# Patient Record
Sex: MALE | Employment: FULL TIME | URBAN - METROPOLITAN AREA
[De-identification: names, ages, dates, MRNs, and addresses within clinical notes are randomized per-mention and may not be internally consistent; named-entity substitution may affect disease eponyms.]

---

## 2023-04-10 LAB
ALANINE AMINOTRANSFERASE (SGPT) (U/L) IN SER/PLAS: 40 U/L (ref 10–52)
ALBUMIN (G/DL) IN SER/PLAS: 4.3 G/DL (ref 3.4–5)
ALKALINE PHOSPHATASE (U/L) IN SER/PLAS: 77 U/L (ref 33–120)
ANION GAP IN SER/PLAS: 10 MMOL/L (ref 10–20)
APPEARANCE, URINE: CLEAR
ASPARTATE AMINOTRANSFERASE (SGOT) (U/L) IN SER/PLAS: 22 U/L (ref 9–39)
BASOPHILS (10*3/UL) IN BLOOD BY AUTOMATED COUNT: 0.02 X10E9/L (ref 0–0.1)
BASOPHILS/100 LEUKOCYTES IN BLOOD BY AUTOMATED COUNT: 0.4 % (ref 0–2)
BILIRUBIN TOTAL (MG/DL) IN SER/PLAS: 0.4 MG/DL (ref 0–1.2)
BILIRUBIN, URINE: NEGATIVE
BLOOD, URINE: NEGATIVE
CALCIUM (MG/DL) IN SER/PLAS: 9.1 MG/DL (ref 8.6–10.6)
CARBON DIOXIDE, TOTAL (MMOL/L) IN SER/PLAS: 28 MMOL/L (ref 21–32)
CHLORIDE (MMOL/L) IN SER/PLAS: 106 MMOL/L (ref 98–107)
CHOLESTEROL (MG/DL) IN SER/PLAS: 148 MG/DL (ref 0–199)
CHOLESTEROL IN HDL (MG/DL) IN SER/PLAS: 35.7 MG/DL
CHOLESTEROL/HDL RATIO: 4.1
COLOR, URINE: NORMAL
CREATININE (MG/DL) IN SER/PLAS: 0.82 MG/DL (ref 0.5–1.3)
EOSINOPHILS (10*3/UL) IN BLOOD BY AUTOMATED COUNT: 0.07 X10E9/L (ref 0–0.7)
EOSINOPHILS/100 LEUKOCYTES IN BLOOD BY AUTOMATED COUNT: 1.3 % (ref 0–6)
ERYTHROCYTE DISTRIBUTION WIDTH (RATIO) BY AUTOMATED COUNT: 12.9 % (ref 11.5–14.5)
ERYTHROCYTE MEAN CORPUSCULAR HEMOGLOBIN CONCENTRATION (G/DL) BY AUTOMATED: 32.2 G/DL (ref 32–36)
ERYTHROCYTE MEAN CORPUSCULAR VOLUME (FL) BY AUTOMATED COUNT: 95 FL (ref 80–100)
ERYTHROCYTES (10*6/UL) IN BLOOD BY AUTOMATED COUNT: 4.83 X10E12/L (ref 4.5–5.9)
ESTIMATED AVERAGE GLUCOSE FOR HBA1C: 120 MG/DL
GFR MALE: >90 ML/MIN/1.73M2
GLUCOSE (MG/DL) IN SER/PLAS: 98 MG/DL (ref 74–99)
GLUCOSE, URINE: NEGATIVE MG/DL
HEMATOCRIT (%) IN BLOOD BY AUTOMATED COUNT: 45.7 % (ref 41–52)
HEMOGLOBIN (G/DL) IN BLOOD: 14.7 G/DL (ref 13.5–17.5)
HEMOGLOBIN A1C/HEMOGLOBIN TOTAL IN BLOOD: 5.8 %
IMMATURE GRANULOCYTES/100 LEUKOCYTES IN BLOOD BY AUTOMATED COUNT: 0.2 % (ref 0–0.9)
KETONES, URINE: NEGATIVE MG/DL
LDL: 76 MG/DL (ref 0–99)
LEUKOCYTE ESTERASE, URINE: NEGATIVE
LEUKOCYTES (10*3/UL) IN BLOOD BY AUTOMATED COUNT: 5.3 X10E9/L (ref 4.4–11.3)
LYMPHOCYTES (10*3/UL) IN BLOOD BY AUTOMATED COUNT: 2.02 X10E9/L (ref 1.2–4.8)
LYMPHOCYTES/100 LEUKOCYTES IN BLOOD BY AUTOMATED COUNT: 37.8 % (ref 13–44)
MONOCYTES (10*3/UL) IN BLOOD BY AUTOMATED COUNT: 0.57 X10E9/L (ref 0.1–1)
MONOCYTES/100 LEUKOCYTES IN BLOOD BY AUTOMATED COUNT: 10.7 % (ref 2–10)
NEUTROPHILS (10*3/UL) IN BLOOD BY AUTOMATED COUNT: 2.65 X10E9/L (ref 1.2–7.7)
NEUTROPHILS/100 LEUKOCYTES IN BLOOD BY AUTOMATED COUNT: 49.6 % (ref 40–80)
NITRITE, URINE: NEGATIVE
NRBC (PER 100 WBCS) BY AUTOMATED COUNT: 0 /100 WBC (ref 0–0)
PH, URINE: 5 (ref 5–8)
PLATELETS (10*3/UL) IN BLOOD AUTOMATED COUNT: 179 X10E9/L (ref 150–450)
POTASSIUM (MMOL/L) IN SER/PLAS: 4.5 MMOL/L (ref 3.5–5.3)
PROSTATE SPECIFIC ANTIGEN,SCREEN: 0.45 NG/ML (ref 0–4)
PROTEIN TOTAL: 6.7 G/DL (ref 6.4–8.2)
PROTEIN, URINE: NEGATIVE MG/DL
SODIUM (MMOL/L) IN SER/PLAS: 139 MMOL/L (ref 136–145)
SPECIFIC GRAVITY, URINE: 1.01 (ref 1–1.03)
TRIGLYCERIDE (MG/DL) IN SER/PLAS: 181 MG/DL (ref 0–149)
UREA NITROGEN (MG/DL) IN SER/PLAS: 14 MG/DL (ref 6–23)
UROBILINOGEN, URINE: <2 MG/DL (ref 0–1.9)
VLDL: 36 MG/DL (ref 0–40)

## 2023-05-22 DIAGNOSIS — I10 HYPERTENSION, UNSPECIFIED TYPE: ICD-10-CM

## 2023-05-22 RX ORDER — LISINOPRIL 10 MG/1
10 TABLET ORAL DAILY
Qty: 90 TABLET | Refills: 2 | Status: SHIPPED | OUTPATIENT
Start: 2023-05-22 | End: 2023-05-22

## 2023-05-22 RX ORDER — LISINOPRIL 10 MG/1
1 TABLET ORAL DAILY
COMMUNITY
Start: 2019-03-15 | End: 2023-05-22 | Stop reason: SDUPTHER

## 2023-06-13 ENCOUNTER — PATIENT MESSAGE (OUTPATIENT)
Dept: PRIMARY CARE | Facility: CLINIC | Age: 48
End: 2023-06-13
Payer: COMMERCIAL

## 2023-06-13 DIAGNOSIS — U07.1 COVID-19: Primary | ICD-10-CM

## 2023-06-13 NOTE — PATIENT COMMUNICATION
Patient presenting today and is COVID positive with symptoms that started less than 5 days ago; this patient is experiencing with mild-mod symptoms, at home, not on oxygen. Given this patient's comorbidities and age this patient meets criteria for Paxlovid. This is dosed based on renal function.    I have e-scripted this in to the patient's preferred pharmacy. Patient is to isolate. All questions and concerns were addressed.

## 2023-08-07 ENCOUNTER — OFFICE VISIT (OUTPATIENT)
Dept: PRIMARY CARE | Facility: CLINIC | Age: 48
End: 2023-08-07
Payer: COMMERCIAL

## 2023-08-07 VITALS
TEMPERATURE: 98.7 F | HEIGHT: 68 IN | HEART RATE: 66 BPM | WEIGHT: 307 LBS | DIASTOLIC BLOOD PRESSURE: 87 MMHG | OXYGEN SATURATION: 97 % | SYSTOLIC BLOOD PRESSURE: 144 MMHG | BODY MASS INDEX: 46.53 KG/M2

## 2023-08-07 DIAGNOSIS — F32.A ANXIETY AND DEPRESSION: Primary | ICD-10-CM

## 2023-08-07 DIAGNOSIS — R73.03 PREDIABETES: ICD-10-CM

## 2023-08-07 DIAGNOSIS — I10 ESSENTIAL HYPERTENSION: ICD-10-CM

## 2023-08-07 DIAGNOSIS — F41.9 ANXIETY AND DEPRESSION: Primary | ICD-10-CM

## 2023-08-07 DIAGNOSIS — E66.01 CLASS 3 SEVERE OBESITY DUE TO EXCESS CALORIES WITH SERIOUS COMORBIDITY AND BODY MASS INDEX (BMI) OF 45.0 TO 49.9 IN ADULT (MULTI): ICD-10-CM

## 2023-08-07 DIAGNOSIS — Z12.11 ENCOUNTER FOR SCREENING FOR MALIGNANT NEOPLASM OF COLON: ICD-10-CM

## 2023-08-07 PROBLEM — J34.89 NASAL OBSTRUCTION: Status: ACTIVE | Noted: 2023-08-07

## 2023-08-07 PROBLEM — J06.9 URI (UPPER RESPIRATORY INFECTION): Status: ACTIVE | Noted: 2023-08-07

## 2023-08-07 PROBLEM — J34.2 DEVIATED NASAL SEPTUM: Status: ACTIVE | Noted: 2023-08-07

## 2023-08-07 PROBLEM — M25.531 BILATERAL WRIST PAIN: Status: ACTIVE | Noted: 2023-08-07

## 2023-08-07 PROBLEM — G56.21 CUBITAL TUNNEL SYNDROME, RIGHT: Status: ACTIVE | Noted: 2023-08-07

## 2023-08-07 PROBLEM — J30.9 ALLERGIC RHINITIS: Status: ACTIVE | Noted: 2023-08-07

## 2023-08-07 PROBLEM — M25.529 ELBOW PAIN: Status: ACTIVE | Noted: 2023-08-07

## 2023-08-07 PROBLEM — G56.22 ULNAR NEUROPATHY OF LEFT UPPER EXTREMITY: Status: ACTIVE | Noted: 2023-08-07

## 2023-08-07 PROBLEM — G47.33 OBSTRUCTIVE SLEEP APNEA: Status: ACTIVE | Noted: 2023-08-07

## 2023-08-07 PROBLEM — R09.81 NASAL CONGESTION: Status: ACTIVE | Noted: 2023-08-07

## 2023-08-07 PROBLEM — R03.0 ELEVATED BLOOD PRESSURE READING WITHOUT DIAGNOSIS OF HYPERTENSION: Status: ACTIVE | Noted: 2023-08-07

## 2023-08-07 PROBLEM — J34.3 HYPERTROPHY OF BOTH INFERIOR NASAL TURBINATES: Status: ACTIVE | Noted: 2023-08-07

## 2023-08-07 PROBLEM — R39.13 SPLIT URINARY STREAM: Status: ACTIVE | Noted: 2023-08-07

## 2023-08-07 PROBLEM — J31.0 CHRONIC RHINITIS: Status: ACTIVE | Noted: 2023-08-07

## 2023-08-07 PROBLEM — R33.9 URINARY RETENTION: Status: ACTIVE | Noted: 2023-08-07

## 2023-08-07 PROBLEM — N35.919 URETHRAL STRICTURE: Status: ACTIVE | Noted: 2023-08-07

## 2023-08-07 PROBLEM — N35.919 URETHRAL MEATAL STENOSIS: Status: ACTIVE | Noted: 2023-08-07

## 2023-08-07 PROBLEM — M95.0 NASAL VALVE COLLAPSE: Status: ACTIVE | Noted: 2023-08-07

## 2023-08-07 PROBLEM — M62.838 MUSCLE SPASM: Status: ACTIVE | Noted: 2023-08-07

## 2023-08-07 PROBLEM — J34.829 NASAL VALVE COLLAPSE: Status: ACTIVE | Noted: 2023-08-07

## 2023-08-07 PROBLEM — M25.532 BILATERAL WRIST PAIN: Status: ACTIVE | Noted: 2023-08-07

## 2023-08-07 PROBLEM — F51.04 CHRONIC INSOMNIA: Status: ACTIVE | Noted: 2023-08-07

## 2023-08-07 PROBLEM — M54.50 LOW BACK PAIN: Status: ACTIVE | Noted: 2023-08-07

## 2023-08-07 PROBLEM — G47.00 INSOMNIA: Status: ACTIVE | Noted: 2023-08-07

## 2023-08-07 PROBLEM — E66.813 CLASS 3 SEVERE OBESITY DUE TO EXCESS CALORIES WITH SERIOUS COMORBIDITY AND BODY MASS INDEX (BMI) OF 45.0 TO 49.9 IN ADULT: Status: ACTIVE | Noted: 2023-08-07

## 2023-08-07 PROCEDURE — 3077F SYST BP >= 140 MM HG: CPT | Performed by: STUDENT IN AN ORGANIZED HEALTH CARE EDUCATION/TRAINING PROGRAM

## 2023-08-07 PROCEDURE — 99214 OFFICE O/P EST MOD 30 MIN: CPT | Performed by: STUDENT IN AN ORGANIZED HEALTH CARE EDUCATION/TRAINING PROGRAM

## 2023-08-07 PROCEDURE — 1036F TOBACCO NON-USER: CPT | Performed by: STUDENT IN AN ORGANIZED HEALTH CARE EDUCATION/TRAINING PROGRAM

## 2023-08-07 PROCEDURE — 3079F DIAST BP 80-89 MM HG: CPT | Performed by: STUDENT IN AN ORGANIZED HEALTH CARE EDUCATION/TRAINING PROGRAM

## 2023-08-07 PROCEDURE — 3008F BODY MASS INDEX DOCD: CPT | Performed by: STUDENT IN AN ORGANIZED HEALTH CARE EDUCATION/TRAINING PROGRAM

## 2023-08-07 RX ORDER — LATANOPROST 50 UG/ML
1 SOLUTION/ DROPS OPHTHALMIC
COMMUNITY
Start: 2021-07-08 | End: 2024-02-12 | Stop reason: WASHOUT

## 2023-08-07 RX ORDER — ESCITALOPRAM OXALATE 20 MG/1
1 TABLET ORAL DAILY
COMMUNITY
Start: 2014-06-11 | End: 2024-02-12 | Stop reason: WASHOUT

## 2023-08-07 RX ORDER — AZELASTINE 1 MG/ML
2 SPRAY, METERED NASAL DAILY
COMMUNITY
Start: 2021-07-12 | End: 2024-02-12 | Stop reason: WASHOUT

## 2023-08-07 RX ORDER — NYSTATIN AND TRIAMCINOLONE ACETONIDE 100000; 1 [USP'U]/G; MG/G
CREAM TOPICAL 2 TIMES DAILY
COMMUNITY
Start: 2023-05-24 | End: 2024-02-12 | Stop reason: WASHOUT

## 2023-08-07 RX ORDER — BRIMONIDINE TARTRATE, TIMOLOL MALEATE 2; 5 MG/ML; MG/ML
SOLUTION/ DROPS OPHTHALMIC
COMMUNITY
Start: 2021-10-15 | End: 2024-02-21 | Stop reason: WASHOUT

## 2023-08-07 NOTE — PATIENT INSTRUCTIONS
Please stop at the lab ([address]) to complete your blood and/or urine work that I've ordered for you.    I will contact you with the results at my soonest convenience. I strongly urge you to use [employer] as this is the quickest and easiest way to access your results and recieve my correspondences.     Keep an eye on your blood pressure, I have a low threshold to increase your dosage of lisinopril.     I have ordered Cologuard to screen you for colon cancer. You will receive a kit at home.     I recommend that you lose weight via lifestyle modifications such as diet and exercise.    Follow up with me in 6 months.

## 2023-08-07 NOTE — PROGRESS NOTES
"Subjective   Patient ID: Jose Angel Winters is a 47 y.o. male who presents for Follow-up.    HPI   HTN, morbid obesity, anxiety/depression, prediabetes     Life/Social:  (Agatha) one son (Ramírez, 6).  at [employer]. Sedentary lifestyle. Dietary indiscretion. Nonsmoker. Social EtOH. No illicits.     Re:  - had surgery just last week for meatal stenosis. Urinating well. Healing nicely.      Re: HTN - at bit elevated today. Takes low dose ACEi. Reports no sx high or low from HTN; denies blurry vision, HA, dizziness LoC CP SoB Peacock and leg swelling. Pre-contemplative about changing the meds.      Re: anxiety - fair control on Lexapro 20mg. Wants to know if he can double up during \"stress crises\" which happen every few months and last a few days. I told him that this was OK temporarily, although may not get the desired results he's looking for. No HI/SI.     Re: obesity / PreDM - BMI > 40. A1c stable and A1c < 6. Not interested in starting medication. Contemplative about meaningful lifestyle changes.      Re: CLIFTON- on CPAP. Stable.      Re: HM - due for colon screen. due for PSA. Shots UTD.      PMHx, FHx, Social Hx, Surg Hx personally reviewed at this appointment. No pertinent findings and/or changes from prior (if applicable).     ROS: Denies wt gain/loss f/c HA LoC CP SOB NVDC. See HPI above, and scanned sheet (if applicable). All other systems are reviewed and are without complaint.        Review of Systems    Objective   /87   Pulse 66   Temp 37.1 °C (98.7 °F)   Ht 1.727 m (5' 8\")   Wt 139 kg (307 lb)   SpO2 97%   BMI 46.68 kg/m²     Physical Exam  Gen: morbidly obese, NAD. AAO x3.  HEENT: NC/AT. Anicteric sclera, symmetric pupils. MMM no thrush.  Neck: Soft, supple. No LAD. No goiter.  CV: RRR nl s1s2 no m/r/g  Pulm: CTAB no w/r/r, good air exchange  GI: obese, soft NTND BS+ no hsm  Ext: WWP no edema  Neuro: II-XII grossly intact, nonfocal systemic findings  MSK: 5/5 strength b/l UE and LE  Gait: " unremarkable     Lab Results   Component Value Date    WBC 5.3 04/10/2023    HGB 14.7 04/10/2023    HCT 45.7 04/10/2023     04/10/2023    CHOL 148 04/10/2023    TRIG 181 (H) 04/10/2023    HDL 35.7 (A) 04/10/2023    ALT 40 04/10/2023    AST 22 04/10/2023     04/10/2023    K 4.5 04/10/2023     04/10/2023    CREATININE 0.82 04/10/2023    BUN 14 04/10/2023    CO2 28 04/10/2023    TSH 1.29 08/04/2021    HGBA1C 5.8 (A) 04/10/2023     par    PULMONARY FUNCTION TESTING  Ordered by an unspecified provider.       Assessment/Plan   # Obesity  - counselled on wt loss via diet, exercise, and other lifestyle modifications     # Depression and/or Anxiety  - continue SSRI      # CLIFTON on CPAP  - continue using CPAP      # PreDM  - A1C q6 mos  - wt loss with diet and exercise; declines GLP1/GIB  - continue ACEi     # meatal stenosis: improved s/p surgery  - follow up urology PRN     # Health Maintenance  - routine blood work  - Colon Cancer Screening: cologuard ordered  - PSA: due, ordered today   - Immunizations: COVID UTD  - AAA screening: Not indicated      Problem List Items Addressed This Visit       Anxiety and depression - Primary    Essential hypertension    Class 3 severe obesity due to excess calories with serious comorbidity and body mass index (BMI) of 45.0 to 49.9 in adult (CMS/[facility])    Relevant Orders    Hemoglobin A1C    Prediabetes    Relevant Orders    Hemoglobin A1C     Other Visit Diagnoses       Encounter for screening for malignant neoplasm of colon        Relevant Orders    Cologuard® colon cancer screening

## 2023-09-30 ENCOUNTER — PHARMACY VISIT (OUTPATIENT)
Dept: PHARMACY | Facility: CLINIC | Age: 48
End: 2023-09-30
Payer: COMMERCIAL

## 2023-09-30 PROCEDURE — RXMED WILLOW AMBULATORY MEDICATION CHARGE

## 2023-10-01 ENCOUNTER — PHARMACY VISIT (OUTPATIENT)
Dept: PHARMACY | Facility: CLINIC | Age: 48
End: 2023-10-01
Payer: COMMERCIAL

## 2023-10-01 PROCEDURE — RXMED WILLOW AMBULATORY MEDICATION CHARGE

## 2023-10-16 ENCOUNTER — PHARMACY VISIT (OUTPATIENT)
Dept: PHARMACY | Facility: CLINIC | Age: 48
End: 2023-10-16
Payer: COMMERCIAL

## 2023-10-16 PROCEDURE — RXMED WILLOW AMBULATORY MEDICATION CHARGE

## 2023-10-24 ENCOUNTER — PHARMACY VISIT (OUTPATIENT)
Dept: PHARMACY | Facility: CLINIC | Age: 48
End: 2023-10-24
Payer: COMMERCIAL

## 2023-10-24 PROCEDURE — RXMED WILLOW AMBULATORY MEDICATION CHARGE

## 2023-10-27 ENCOUNTER — PHARMACY VISIT (OUTPATIENT)
Dept: PHARMACY | Facility: CLINIC | Age: 48
End: 2023-10-27
Payer: COMMERCIAL

## 2023-10-27 PROCEDURE — RXMED WILLOW AMBULATORY MEDICATION CHARGE

## 2023-11-24 ENCOUNTER — PHARMACY VISIT (OUTPATIENT)
Dept: PHARMACY | Facility: CLINIC | Age: 48
End: 2023-11-24
Payer: COMMERCIAL

## 2023-11-24 PROCEDURE — RXMED WILLOW AMBULATORY MEDICATION CHARGE

## 2023-11-27 ENCOUNTER — OFFICE VISIT (OUTPATIENT)
Dept: UROLOGY | Facility: CLINIC | Age: 48
End: 2023-11-27
Payer: COMMERCIAL

## 2023-11-27 VITALS — WEIGHT: 310.2 LBS | HEIGHT: 68 IN | TEMPERATURE: 96 F | BODY MASS INDEX: 47.01 KG/M2

## 2023-11-27 DIAGNOSIS — N35.811 OTHER STRICTURE OF URETHRAL MEATUS IN MALE: Primary | ICD-10-CM

## 2023-11-27 DIAGNOSIS — N32.0 STENOSIS (ACQUIRED) OF BLADDER NECK OR VESICOURETHRAL ORIFICE: ICD-10-CM

## 2023-11-27 LAB
POC APPEARANCE, URINE: CLEAR
POC BILIRUBIN, URINE: NEGATIVE
POC BLOOD, URINE: NEGATIVE
POC COLOR, URINE: YELLOW
POC GLUCOSE, URINE: NEGATIVE MG/DL
POC KETONES, URINE: NEGATIVE MG/DL
POC LEUKOCYTES, URINE: NEGATIVE
POC NITRITE,URINE: NEGATIVE
POC PH, URINE: 5.5 PH
POC PROTEIN, URINE: NEGATIVE MG/DL
POC SPECIFIC GRAVITY, URINE: 1.02
POC UROBILINOGEN, URINE: 0.2 EU/DL

## 2023-11-27 PROCEDURE — 99213 OFFICE O/P EST LOW 20 MIN: CPT | Performed by: STUDENT IN AN ORGANIZED HEALTH CARE EDUCATION/TRAINING PROGRAM

## 2023-11-27 PROCEDURE — 3008F BODY MASS INDEX DOCD: CPT | Performed by: STUDENT IN AN ORGANIZED HEALTH CARE EDUCATION/TRAINING PROGRAM

## 2023-11-27 PROCEDURE — 1036F TOBACCO NON-USER: CPT | Performed by: STUDENT IN AN ORGANIZED HEALTH CARE EDUCATION/TRAINING PROGRAM

## 2023-11-27 PROCEDURE — 81003 URINALYSIS AUTO W/O SCOPE: CPT | Performed by: STUDENT IN AN ORGANIZED HEALTH CARE EDUCATION/TRAINING PROGRAM

## 2023-11-27 RX ORDER — TALC
3 POWDER (GRAM) TOPICAL NIGHTLY
COMMUNITY

## 2023-11-27 RX ORDER — CHOLECALCIFEROL (VITAMIN D3) 50 MCG
50 TABLET ORAL DAILY
COMMUNITY

## 2023-11-27 NOTE — PROGRESS NOTES
Jose Angel presents for a follow up evaluation.  The patient’s EMR has been reviewed.  Lives in [place], [place]  Occupation:   Referred by Dr. Guzman for meatal stenosis.   s/p meatal urethroplasty with me. (08/04/23)  Cysto revealed no other strictures or abnormalities.  26 Fr opening created. He was left with no catheter.    SUBJECTIVE: HPI   TODAY (11/27/23)  Doing well overall.   No acute or worsening complaints today.  Stream has remained good, occasional splitting.   Remains satisfied with the results of the procedure.   Denies any worsening LUTS.  Actually since discontinuing coffee his LUTS have improved.   No recent UTI's, dysuria, gross hematuria, fevers, chills or rash.     TO REVIEW: Last evaluation (08/28/23)  Reports he is doing well overall, post-operatively.  Experienced mild discomfort initially post-op.  Well-controlled with OTC Tylenol PRN.  Reports his stream is now improved.  Continues to spray while voiding, but this is not bothersome.  Reports persistent sensation of incomplete emptying occasionally.  No recent UTIs, gross hematuria, fevers or chills.     Uroflow results:  Peak 23 mL/s, normal curve.   mL  PVR 71 mL.     TO REVIEW: Initial evaluation (06/30/23)  Hx of prostatitis, morbid obesity, HTN, depression, anxiety, CLIFTON (on CPAP).  Reports developing severe anxiety towards medical procedures.   Has required sedation in the past.      C/O gradually worsening frequency, urgency, and straining to void.  Occasional sensation of incomplete bladder emptying.   Ongoing for the last couple years.   however became bothersome about 10 months ago.  Previously evaluated by Dr. Guzman whom prescribed a topical steroid   He denies any significant improvement with this, thus opted to discontinue.   Last used about 1.5 weeks ago.   Denies any recent UTIs, gross hematuria, flank pain, fevers or chills.     Prior Uroflow results: (05/24/23)  Qmax: 17 mL/s, curve  VV: 380 mL  PVR: 71 mL      PMHx of prostatitis remotely, 2-3x, presumed 2/2 UTI.   Successfully treated with ciprofloxacin.    PSHx of inguinal hernia repair, sebaceous cyst removal of the cheek (oral).  Denies FHx of  malignancy.  Non-smoker.    Past medical, surgical, family and social history in the chart was reviewed and is accurate including any additions to what is in this HPI.    Review of Systems   Constitutional: denies any unintentional weight loss or change in strength.  Integumentary: denies any rashes or pruritus.  Eyes: denies any double vision or eye pain.  Ear/Nose/Mouth/Throat: denies any nosebleeds or gum bleeds.  Cardiovascular: denies any chest pain or syncope.  Respiratory: denies hemoptysis.  Gastrointestinal: denies nausea or vomiting.  Musculoskeletal: denies muscle cramping or weakness.  Neurologic: denies convulsions or seizures.  Hematologic/Lymphatic: denies bleeding tendencies.  Endocrine: denies heat/cold intolerance.  All other systems have been reviewed and are negative unless otherwise noted in the HPI.    OBJECTIVE:  There were no vitals taken for this visit.  Physical Exam   Constitutional: No obvious distress.  Eyes: Non-injected conjunctiva, sclera clear, EOMI.  Ears/Nose/Mouth/Throat: No obvious drainage per ears or nose.  Cardiovascular: Extremities are warm and well perfused. No edema, cyanosis or pallor.  Respiratory: No audible wheezing/stridor; respirations do not appear labored.  Gastrointestinal: Abdomen soft, not distended.  Musculoskeletal: Normal ROM of extremities.  Skin: No obvious rashes or open sores.  Neurologic: Alert and oriented, CN 2-12 grossly intact.  Psychiatric: Answers questions appropriately with normal affect.  Hematologic/Lymphatic/Immunologic: No obvious bruises or sites of spontaneous bleeding.  Genitourinary: No CVA tenderness, bladder not palpable.     Labs and imaging:  Lab Results   Component Value Date    WBC 6.0 07/20/2023    HGB 14.5 07/20/2023    HCT 45.3  07/20/2023     07/20/2023    CHOL 148 04/10/2023    TRIG 181 (H) 04/10/2023    HDL 35.7 (A) 04/10/2023    ALT 74 (H) 07/20/2023    AST 42 (H) 07/20/2023     07/20/2023    K 4.3 07/20/2023     07/20/2023    CREATININE 0.9 07/20/2023    BUN 12 07/20/2023    CO2 26 07/20/2023    TSH 1.29 08/04/2021    INR 0.9 07/20/2023    HGBA1C 5.8 (A) 04/10/2023     ASSESSMENT:  Problem List Items Addressed This Visit    None    1. Meatal stenosis  2. Irritable voiding symptoms  3. Anxiety/depression - severe anxiety towards undergoing medical procedures.  4. CLIFTON - on CPAP  5. Morbid obesity  6. HTN    PLAN:  s/p meatal urethroplasty with me. (08/04/23)  Remains satisfied with the results of the procedure.   Continues to progress well post-operatively.  RTC in about 9 months for reassessment and flow/pvr.   Encouraged to call if he develops any acute or worsening symptoms.    All questions were answered to the patient’s satisfaction.  Patient agrees with the plan and wishes to proceed.    Scribed for Dr. Jesus Carter by Emil Beckman.  I, Dr. Jesus Carter have personally reviewed and agreed with the information entered by the Virtual Scribe. 11/27/23.

## 2024-01-03 DIAGNOSIS — Z00.00 HEALTHCARE MAINTENANCE: ICD-10-CM

## 2024-01-03 PROCEDURE — RXMED WILLOW AMBULATORY MEDICATION CHARGE

## 2024-01-03 RX ORDER — ESCITALOPRAM OXALATE 20 MG/1
20 TABLET ORAL DAILY
Qty: 90 TABLET | Refills: 3 | Status: SHIPPED | OUTPATIENT
Start: 2024-01-03 | End: 2025-01-01

## 2024-01-04 ENCOUNTER — PHARMACY VISIT (OUTPATIENT)
Dept: PHARMACY | Facility: CLINIC | Age: 49
End: 2024-01-04
Payer: COMMERCIAL

## 2024-02-11 PROCEDURE — RXMED WILLOW AMBULATORY MEDICATION CHARGE

## 2024-02-11 RX ORDER — BRIMONIDINE TARTRATE AND TIMOLOL MALEATE 2; 5 MG/ML; MG/ML
SOLUTION OPHTHALMIC
Qty: 5 ML | Refills: 4 | Status: CANCELLED | OUTPATIENT
Start: 2024-02-11 | End: 2025-02-10

## 2024-02-11 ASSESSMENT — ENCOUNTER SYMPTOMS: HYPERTENSION: 1

## 2024-02-12 ENCOUNTER — PHARMACY VISIT (OUTPATIENT)
Dept: PHARMACY | Facility: CLINIC | Age: 49
End: 2024-02-12
Payer: COMMERCIAL

## 2024-02-12 ENCOUNTER — OFFICE VISIT (OUTPATIENT)
Dept: PRIMARY CARE | Facility: CLINIC | Age: 49
End: 2024-02-12
Payer: COMMERCIAL

## 2024-02-12 VITALS
HEIGHT: 67 IN | OXYGEN SATURATION: 96 % | TEMPERATURE: 96.9 F | DIASTOLIC BLOOD PRESSURE: 81 MMHG | SYSTOLIC BLOOD PRESSURE: 151 MMHG | BODY MASS INDEX: 49.28 KG/M2 | HEART RATE: 84 BPM | WEIGHT: 314 LBS

## 2024-02-12 DIAGNOSIS — F41.9 ANXIETY AND DEPRESSION: ICD-10-CM

## 2024-02-12 DIAGNOSIS — F32.A ANXIETY AND DEPRESSION: ICD-10-CM

## 2024-02-12 DIAGNOSIS — E78.5 HYPERLIPIDEMIA, UNSPECIFIED HYPERLIPIDEMIA TYPE: ICD-10-CM

## 2024-02-12 DIAGNOSIS — I10 ESSENTIAL HYPERTENSION: ICD-10-CM

## 2024-02-12 DIAGNOSIS — E66.01 CLASS 3 SEVERE OBESITY DUE TO EXCESS CALORIES WITH SERIOUS COMORBIDITY AND BODY MASS INDEX (BMI) OF 45.0 TO 49.9 IN ADULT (MULTI): ICD-10-CM

## 2024-02-12 DIAGNOSIS — Z00.00 HEALTHCARE MAINTENANCE: Primary | ICD-10-CM

## 2024-02-12 DIAGNOSIS — R73.03 PREDIABETES: ICD-10-CM

## 2024-02-12 DIAGNOSIS — I10 HYPERTENSION, UNSPECIFIED TYPE: ICD-10-CM

## 2024-02-12 DIAGNOSIS — Z12.5 ENCOUNTER FOR SCREENING FOR MALIGNANT NEOPLASM OF PROSTATE: ICD-10-CM

## 2024-02-12 PROBLEM — J06.9 URI (UPPER RESPIRATORY INFECTION): Status: RESOLVED | Noted: 2023-08-07 | Resolved: 2024-02-12

## 2024-02-12 PROBLEM — M25.529 ELBOW PAIN: Status: RESOLVED | Noted: 2023-08-07 | Resolved: 2024-02-12

## 2024-02-12 PROBLEM — G56.22 ULNAR NEUROPATHY OF LEFT UPPER EXTREMITY: Status: RESOLVED | Noted: 2023-08-07 | Resolved: 2024-02-12

## 2024-02-12 PROBLEM — M25.532 BILATERAL WRIST PAIN: Status: RESOLVED | Noted: 2023-08-07 | Resolved: 2024-02-12

## 2024-02-12 PROBLEM — M62.838 MUSCLE SPASM: Status: RESOLVED | Noted: 2023-08-07 | Resolved: 2024-02-12

## 2024-02-12 PROBLEM — F51.04 CHRONIC INSOMNIA: Status: RESOLVED | Noted: 2023-08-07 | Resolved: 2024-02-12

## 2024-02-12 PROBLEM — M25.531 BILATERAL WRIST PAIN: Status: RESOLVED | Noted: 2023-08-07 | Resolved: 2024-02-12

## 2024-02-12 PROBLEM — J34.3 HYPERTROPHY OF BOTH INFERIOR NASAL TURBINATES: Status: RESOLVED | Noted: 2023-08-07 | Resolved: 2024-02-12

## 2024-02-12 PROBLEM — M95.0 NASAL VALVE COLLAPSE: Status: RESOLVED | Noted: 2023-08-07 | Resolved: 2024-02-12

## 2024-02-12 PROBLEM — R09.81 NASAL CONGESTION: Status: RESOLVED | Noted: 2023-08-07 | Resolved: 2024-02-12

## 2024-02-12 PROBLEM — N35.919 URETHRAL MEATAL STENOSIS: Status: RESOLVED | Noted: 2023-08-07 | Resolved: 2024-02-12

## 2024-02-12 PROBLEM — G56.21 CUBITAL TUNNEL SYNDROME, RIGHT: Status: RESOLVED | Noted: 2023-08-07 | Resolved: 2024-02-12

## 2024-02-12 PROBLEM — N35.919 URETHRAL STRICTURE: Status: RESOLVED | Noted: 2023-08-07 | Resolved: 2024-02-12

## 2024-02-12 PROBLEM — J34.89 NASAL OBSTRUCTION: Status: RESOLVED | Noted: 2023-08-07 | Resolved: 2024-02-12

## 2024-02-12 PROBLEM — H40.1131 PRIMARY OPEN ANGLE GLAUCOMA (POAG) OF BOTH EYES, MILD STAGE: Status: RESOLVED | Noted: 2023-11-17 | Resolved: 2024-02-12

## 2024-02-12 PROBLEM — R33.9 URINARY RETENTION: Status: RESOLVED | Noted: 2023-08-07 | Resolved: 2024-02-12

## 2024-02-12 PROBLEM — J34.829 NASAL VALVE COLLAPSE: Status: RESOLVED | Noted: 2023-08-07 | Resolved: 2024-02-12

## 2024-02-12 PROBLEM — J34.2 DEVIATED NASAL SEPTUM: Status: RESOLVED | Noted: 2023-08-07 | Resolved: 2024-02-12

## 2024-02-12 PROBLEM — R39.13 SPLIT URINARY STREAM: Status: RESOLVED | Noted: 2023-08-07 | Resolved: 2024-02-12

## 2024-02-12 PROBLEM — J30.9 ALLERGIC RHINITIS: Status: RESOLVED | Noted: 2023-08-07 | Resolved: 2024-02-12

## 2024-02-12 PROBLEM — R03.0 ELEVATED BLOOD PRESSURE READING WITHOUT DIAGNOSIS OF HYPERTENSION: Status: RESOLVED | Noted: 2023-08-07 | Resolved: 2024-02-12

## 2024-02-12 PROCEDURE — 99213 OFFICE O/P EST LOW 20 MIN: CPT | Performed by: STUDENT IN AN ORGANIZED HEALTH CARE EDUCATION/TRAINING PROGRAM

## 2024-02-12 PROCEDURE — 1036F TOBACCO NON-USER: CPT | Performed by: STUDENT IN AN ORGANIZED HEALTH CARE EDUCATION/TRAINING PROGRAM

## 2024-02-12 PROCEDURE — 99396 PREV VISIT EST AGE 40-64: CPT | Performed by: STUDENT IN AN ORGANIZED HEALTH CARE EDUCATION/TRAINING PROGRAM

## 2024-02-12 PROCEDURE — 3079F DIAST BP 80-89 MM HG: CPT | Performed by: STUDENT IN AN ORGANIZED HEALTH CARE EDUCATION/TRAINING PROGRAM

## 2024-02-12 PROCEDURE — 3077F SYST BP >= 140 MM HG: CPT | Performed by: STUDENT IN AN ORGANIZED HEALTH CARE EDUCATION/TRAINING PROGRAM

## 2024-02-12 PROCEDURE — RXMED WILLOW AMBULATORY MEDICATION CHARGE

## 2024-02-12 PROCEDURE — 3008F BODY MASS INDEX DOCD: CPT | Performed by: STUDENT IN AN ORGANIZED HEALTH CARE EDUCATION/TRAINING PROGRAM

## 2024-02-12 RX ORDER — BRIMONIDINE TARTRATE AND TIMOLOL MALEATE 2; 5 MG/ML; MG/ML
SOLUTION OPHTHALMIC
Qty: 5 ML | Refills: 3 | OUTPATIENT
Start: 2024-02-12

## 2024-02-12 RX ORDER — LISINOPRIL 20 MG/1
20 TABLET ORAL DAILY
Qty: 90 TABLET | Refills: 3 | Status: SHIPPED | OUTPATIENT
Start: 2024-02-12 | End: 2025-02-11

## 2024-02-12 ASSESSMENT — ENCOUNTER SYMPTOMS: HYPERTENSION: 1

## 2024-02-12 NOTE — PATIENT INSTRUCTIONS
Please stop at the lab ([address]) to complete your blood and/or urine work that I've ordered for you.    I will contact you with the results at my soonest convenience. I strongly urge you to use [employer] as this is the quickest and easiest way to access your results and receive my correspondences.    I have added or changed your medications today. See the medication section of this packet for full details.      I recommend that you lose weight via lifestyle modifications such as diet and exercise.     I am referring you to our Pharmacist to help control your blood pressure.     Your cologuard order remains active, you need to turn in the kit.

## 2024-02-12 NOTE — PROGRESS NOTES
"Subjective   Patient ID: Jose Angel Winters is a 48 y.o. male who presents for No chief complaint on file..    Hypertension  This is a chronic problem. The current episode started more than 1 year ago. The problem has been waxing and waning since onset. The problem is controlled. Associated symptoms include anxiety. Agents associated with hypertension include NSAIDs. Risk factors for coronary artery disease include obesity and stress. Compliance problems include diet, exercise and psychosocial issues.       Re: HTN - at bit elevated today. Takes low dose ACEi. Reports no sx high or low from HTN; denies blurry vision, HA, dizziness LoC CP SoB Peacock and leg swelling. Pre-contemplative about changing the meds.     Re:  - Doing great since meatal stenosis surgery.      Re: anxiety - fair control on Lexapro 20mg. Wants to know if he can double up during \"stress crises\" which happen every few months and last a few days. I told him that this was OK temporarily, although may not get the desired results he's looking for. No HI/SI.     Re: obesity / PreDM - BMI > 40. A1c stable and A1c < 6 due for repeat A1c. Not interested in starting medication. Contemplative about meaningful lifestyle changes.      Re: CLIFTON - on CPAP. Stable.      Re: HM - due for colon screen; cologuard box is at his house he just needs to turn it in. Due for PSA. Shots UTD.      PMHx, FHx, Social Hx, Surg Hx personally reviewed at this appointment. No pertinent findings and/or changes from prior (if applicable).     ROS: Denies wt gain/loss f/c HA LoC CP SOB NVDC. See HPI above, and scanned sheet (if applicable). All other systems are reviewed and are without complaint.            Review of Systems    Objective   /81   Pulse 84   Temp 36.1 °C (96.9 °F)   Ht 1.702 m (5' 7\")   Wt 142 kg (314 lb)   SpO2 96%   BMI 49.18 kg/m²     Physical Exam  Gen: morbidly obese, NAD. AAO x3.  HEENT: NC/AT. Anicteric sclera, symmetric pupils. MMM no thrush.  Neck: " Soft, supple. No LAD. No goiter.  CV: RRR nl s1s2 no m/r/g  Pulm: CTAB no w/r/r, good air exchange  GI: obese, soft NTND BS+ no hsm  Ext: WWP no edema  Neuro: II-XII grossly intact, nonfocal systemic findings  MSK: 5/5 strength b/l UE and LE  Gait: unremarkable     Lab Results   Component Value Date    WBC 6.0 07/20/2023    HGB 14.5 07/20/2023    HCT 45.3 07/20/2023     07/20/2023    CHOL 148 04/10/2023    TRIG 181 (H) 04/10/2023    HDL 35.7 (A) 04/10/2023    ALT 74 (H) 07/20/2023    AST 42 (H) 07/20/2023     07/20/2023    K 4.3 07/20/2023     07/20/2023    CREATININE 0.9 07/20/2023    BUN 12 07/20/2023    CO2 26 07/20/2023    TSH 1.29 08/04/2021    INR 0.9 07/20/2023    HGBA1C 5.8 (A) 04/10/2023     par    PULMONARY FUNCTION TESTING  Ordered by an unspecified provider.     Assessment/Plan   # HTN: not at goal - separate from remainder of preventative visit   - ambulatory pressures, RTC 4-8 weeks with BP log  - routine blood/urine work, if not recent  - lifestyle modifications discussed at length   - increase Lisinopril  - APC referral    ------  # Obesity  - counselled on wt loss via diet, exercise, and other lifestyle modifications     # Depression and/or Anxiety  - continue SSRI      # CLIFTON on CPAP  - continue using CPAP      # PreDM  - A1C q6 mos  - wt loss with diet and exercise; declines GLP1/GIB  - continue ACEi, increased dose     # meatal stenosis: improved s/p surgery  - follow up urology PRN     # Health Maintenance  - routine blood work  - Colon Cancer Screening: cologuard order is active  - PSA: due, ordered today   - Immunizations: UTD  - AAA screening: Not indicated     Problem List Items Addressed This Visit             ICD-10-CM    Essential hypertension I10    Relevant Orders    Follow Up In [facility] - Pharmacy    Class 3 severe obesity due to excess calories with serious comorbidity and body mass index (BMI) of 45.0 to 49.9 in adult (CMS/HCC) E66.01, Z68.42     Prediabetes R73.03    Relevant Orders    Hemoglobin A1C     Other Visit Diagnoses         Codes    Healthcare maintenance    -  Primary Z00.00    Hypertension, unspecified type     I10    Relevant Medications    lisinopril 20 mg tablet    Hyperlipidemia, unspecified hyperlipidemia type     E78.5    Relevant Orders    CBC and Auto Differential    Comprehensive Metabolic Panel    Lipid Panel    Encounter for screening for malignant neoplasm of prostate     Z12.5    Relevant Orders    PSA

## 2024-02-21 ENCOUNTER — TELEMEDICINE (OUTPATIENT)
Dept: PHARMACY | Facility: HOSPITAL | Age: 49
End: 2024-02-21
Payer: COMMERCIAL

## 2024-02-21 DIAGNOSIS — I10 ESSENTIAL HYPERTENSION: ICD-10-CM

## 2024-02-21 RX ORDER — CETIRIZINE HYDROCHLORIDE 10 MG/1
10 TABLET ORAL DAILY
COMMUNITY

## 2024-02-21 NOTE — PROGRESS NOTES
"Hypertension Clinic   Jose Angel Winters was referred to the Clinical Pharmacy Team for his blood pressure management.    Referring Provider: Srinath Petty MD     HYPERTENSION ASSESSMENT    - BP in office = 151/81; lisinopril increased from 10 mg to 20 mg    CURRENT PHARMACOTHERAPY  - Lisinopril 20 mg daily    HISTORICAL PHARMACOTHERAPY  - None    Blood Pressure Monitor Validated: No  - Has a large arm cuff    SMBP MEASUREMENTS  Date Systolic Diastolic Heart Rate   -      -        Has the patient experienced any low blood pressures since last contact? No  - denies any  symptoms of low blood pressure: lightheadedness, dizziness, falls, blurry vision, clammy/pale skin   Has the patient experienced any high blood pressures since last contact? No  - denies any  symptoms of high blood pressure: headache, chest pain, vision problems    RELEVANT LAB RESULTS  Lab Results   Component Value Date    CREATININE 0.9 07/20/2023    BUN 12 07/20/2023     07/20/2023    K 4.3 07/20/2023     07/20/2023    CO2 26 07/20/2023     Lab Results   Component Value Date    CHOL 148 04/10/2023    CHOL 131 07/25/2022    CHOL 145 08/04/2021     Lab Results   Component Value Date    HDL 35.7 (A) 04/10/2023    HDL 33.0 (A) 07/25/2022    HDL 35.1 (A) 08/04/2021     No results found for: \"LDLCALC\"  Lab Results   Component Value Date    TRIG 181 (H) 04/10/2023    TRIG 101 07/25/2022    TRIG 150 (H) 08/04/2021     No components found for: \"CHOLHDL\"  The ASCVD Risk score (Jeison LUNA, et al., 2019) failed to calculate for the following reasons:    Unable to determine if patient is Non-      PATIENT EDUCATION/GOALS  Blood pressure goal is less than 140/90 mmHg    Eat right: Your diet should be rich in fruits, vegetables, potassium, and low-fat dairy products. You should also reduce your intake of sodium and fats, particularly saturated fats.  Maintain a healthy weight: Try to achieve and maintain a healthy weight. If " you are unsure what this means for you, please contact our clinic.  Exercise: Try to get at least 30 minutes of aerobic exercise every day.  Moderate your alcohol consumption: Limit your alcohol intake to one drink per day.  Monitor your blood pressure: You should check your blood pressure regularly. Notify our clinic if your blood pressure readings are consistently higher than recommended.    PROVIDER IMPRESSIONS/PLAN:    Assessment/Plan   Problem List Items Addressed This Visit             ICD-10-CM    Essential hypertension I10     1. Blood Pressure: borderline controlled.   - Blood Pressure monitor is not validated for at home use    2 Pharmacologic changes:   CONTINUE: Lisinopril 20 mg daily  Continue all meds under the continuation of care with the referring provider and clinical pharmacy team.    3. Counseling Points:  - I have counseled this patient on appropriate blood pressure monitor techniques, provided a blood pressure monitor log, and have advised the patient to contact me with questions regarding their blood pressure.    Pharmacist Follow Up: 3/4 9am in person BP cuff validation         Relevant Orders    Follow Up In [facility] - Pharmacy     Thank you,   Marissa Patrick, PharmD, [facility]    Continue all meds under the continuation of care with the referring provider and clinical pharmacy team.

## 2024-02-22 NOTE — ASSESSMENT & PLAN NOTE
1. Blood Pressure: borderline controlled.   - Blood Pressure monitor is not validated for at home use    2 Pharmacologic changes:   CONTINUE: Lisinopril 20 mg daily  Continue all meds under the continuation of care with the referring provider and clinical pharmacy team.    3. Counseling Points:  - I have counseled this patient on appropriate blood pressure monitor techniques, provided a blood pressure monitor log, and have advised the patient to contact me with questions regarding their blood pressure.    Pharmacist Follow Up: 3/4 9am in person BP cuff validation

## 2024-02-23 LAB — NONINV COLON CA DNA+OCC BLD SCRN STL QL: NEGATIVE

## 2024-03-04 ENCOUNTER — CLINICAL SUPPORT (OUTPATIENT)
Dept: PRIMARY CARE | Facility: CLINIC | Age: 49
End: 2024-03-04
Payer: COMMERCIAL

## 2024-03-04 VITALS — DIASTOLIC BLOOD PRESSURE: 72 MMHG | HEART RATE: 71 BPM | SYSTOLIC BLOOD PRESSURE: 110 MMHG

## 2024-03-04 DIAGNOSIS — I10 ESSENTIAL HYPERTENSION: ICD-10-CM

## 2024-03-04 NOTE — ASSESSMENT & PLAN NOTE
1. Blood Pressure: well controlled.   - Blood Pressure monitor is validated for at home use     2 Pharmacologic changes:   CONTINUE: Lisinopril 20 mg daily  Continue all meds under the continuation of care with the referring provider and clinical pharmacy team.     3. Counseling Points:  - I have counseled this patient on appropriate blood pressure monitor techniques, provided a blood pressure monitor log, and have advised the patient to contact me with questions regarding their blood pressure.     Pharmacist Follow Up: 3/18 9:30 AM for lab & BP follow up

## 2024-03-11 DIAGNOSIS — M25.579 CHRONIC ANKLE PAIN, UNSPECIFIED LATERALITY: Primary | ICD-10-CM

## 2024-03-11 DIAGNOSIS — G89.29 CHRONIC ANKLE PAIN, UNSPECIFIED LATERALITY: Primary | ICD-10-CM

## 2024-03-13 ENCOUNTER — PHARMACY VISIT (OUTPATIENT)
Dept: PHARMACY | Facility: CLINIC | Age: 49
End: 2024-03-13
Payer: COMMERCIAL

## 2024-03-13 PROCEDURE — RXMED WILLOW AMBULATORY MEDICATION CHARGE

## 2024-03-15 ENCOUNTER — LAB (OUTPATIENT)
Dept: LAB | Facility: LAB | Age: 49
End: 2024-03-15
Payer: COMMERCIAL

## 2024-03-15 DIAGNOSIS — R73.03 PREDIABETES: ICD-10-CM

## 2024-03-15 DIAGNOSIS — E78.5 HYPERLIPIDEMIA, UNSPECIFIED HYPERLIPIDEMIA TYPE: ICD-10-CM

## 2024-03-15 DIAGNOSIS — Z12.5 ENCOUNTER FOR SCREENING FOR MALIGNANT NEOPLASM OF PROSTATE: ICD-10-CM

## 2024-03-15 DIAGNOSIS — E66.01 CLASS 3 SEVERE OBESITY DUE TO EXCESS CALORIES WITH SERIOUS COMORBIDITY AND BODY MASS INDEX (BMI) OF 45.0 TO 49.9 IN ADULT (MULTI): ICD-10-CM

## 2024-03-15 LAB
ALBUMIN SERPL BCP-MCNC: 4.2 G/DL (ref 3.4–5)
ALP SERPL-CCNC: 87 U/L (ref 33–120)
ALT SERPL W P-5'-P-CCNC: 59 U/L (ref 10–52)
ANION GAP SERPL CALC-SCNC: 12 MMOL/L (ref 10–20)
AST SERPL W P-5'-P-CCNC: 31 U/L (ref 9–39)
BASOPHILS # BLD AUTO: 0.01 X10*3/UL (ref 0–0.1)
BASOPHILS NFR BLD AUTO: 0.2 %
BILIRUB SERPL-MCNC: 0.4 MG/DL (ref 0–1.2)
BUN SERPL-MCNC: 10 MG/DL (ref 6–23)
CALCIUM SERPL-MCNC: 8.6 MG/DL (ref 8.6–10.6)
CHLORIDE SERPL-SCNC: 104 MMOL/L (ref 98–107)
CHOLEST SERPL-MCNC: 137 MG/DL (ref 0–199)
CHOLESTEROL/HDL RATIO: 4.1
CO2 SERPL-SCNC: 28 MMOL/L (ref 21–32)
CREAT SERPL-MCNC: 0.76 MG/DL (ref 0.5–1.3)
EGFRCR SERPLBLD CKD-EPI 2021: >90 ML/MIN/1.73M*2
EOSINOPHIL # BLD AUTO: 0.07 X10*3/UL (ref 0–0.7)
EOSINOPHIL NFR BLD AUTO: 1.3 %
ERYTHROCYTE [DISTWIDTH] IN BLOOD BY AUTOMATED COUNT: 12.9 % (ref 11.5–14.5)
EST. AVERAGE GLUCOSE BLD GHB EST-MCNC: 123 MG/DL
GLUCOSE SERPL-MCNC: 100 MG/DL (ref 74–99)
HBA1C MFR BLD: 5.9 %
HCT VFR BLD AUTO: 42.3 % (ref 41–52)
HDLC SERPL-MCNC: 33.5 MG/DL
HGB BLD-MCNC: 14 G/DL (ref 13.5–17.5)
IMM GRANULOCYTES # BLD AUTO: 0.01 X10*3/UL (ref 0–0.7)
IMM GRANULOCYTES NFR BLD AUTO: 0.2 % (ref 0–0.9)
LDLC SERPL CALC-MCNC: 79 MG/DL
LYMPHOCYTES # BLD AUTO: 1.68 X10*3/UL (ref 1.2–4.8)
LYMPHOCYTES NFR BLD AUTO: 30.6 %
MCH RBC QN AUTO: 30.3 PG (ref 26–34)
MCHC RBC AUTO-ENTMCNC: 33.1 G/DL (ref 32–36)
MCV RBC AUTO: 92 FL (ref 80–100)
MONOCYTES # BLD AUTO: 0.57 X10*3/UL (ref 0.1–1)
MONOCYTES NFR BLD AUTO: 10.4 %
NEUTROPHILS # BLD AUTO: 3.15 X10*3/UL (ref 1.2–7.7)
NEUTROPHILS NFR BLD AUTO: 57.3 %
NON HDL CHOLESTEROL: 104 MG/DL (ref 0–149)
NRBC BLD-RTO: 0 /100 WBCS (ref 0–0)
PLATELET # BLD AUTO: 191 X10*3/UL (ref 150–450)
POTASSIUM SERPL-SCNC: 4.4 MMOL/L (ref 3.5–5.3)
PROT SERPL-MCNC: 6.5 G/DL (ref 6.4–8.2)
PSA SERPL-MCNC: 0.37 NG/ML
RBC # BLD AUTO: 4.62 X10*6/UL (ref 4.5–5.9)
SODIUM SERPL-SCNC: 140 MMOL/L (ref 136–145)
TRIGL SERPL-MCNC: 122 MG/DL (ref 0–149)
VLDL: 24 MG/DL (ref 0–40)
WBC # BLD AUTO: 5.5 X10*3/UL (ref 4.4–11.3)

## 2024-03-15 PROCEDURE — 84153 ASSAY OF PSA TOTAL: CPT

## 2024-03-15 PROCEDURE — 80053 COMPREHEN METABOLIC PANEL: CPT

## 2024-03-15 PROCEDURE — 85025 COMPLETE CBC W/AUTO DIFF WBC: CPT

## 2024-03-15 PROCEDURE — 36415 COLL VENOUS BLD VENIPUNCTURE: CPT

## 2024-03-15 PROCEDURE — 80061 LIPID PANEL: CPT

## 2024-03-15 PROCEDURE — 83036 HEMOGLOBIN GLYCOSYLATED A1C: CPT

## 2024-03-18 ENCOUNTER — TELEMEDICINE (OUTPATIENT)
Dept: PHARMACY | Facility: HOSPITAL | Age: 49
End: 2024-03-18
Payer: COMMERCIAL

## 2024-03-18 ENCOUNTER — OFFICE VISIT (OUTPATIENT)
Dept: ORTHOPEDIC SURGERY | Facility: HOSPITAL | Age: 49
End: 2024-03-18
Payer: COMMERCIAL

## 2024-03-18 ENCOUNTER — HOSPITAL ENCOUNTER (OUTPATIENT)
Dept: RADIOLOGY | Facility: HOSPITAL | Age: 49
Discharge: HOME | End: 2024-03-18
Payer: COMMERCIAL

## 2024-03-18 VITALS — WEIGHT: 312 LBS | BODY MASS INDEX: 47.29 KG/M2 | HEIGHT: 68 IN

## 2024-03-18 DIAGNOSIS — G89.29 CHRONIC ANKLE PAIN, UNSPECIFIED LATERALITY: ICD-10-CM

## 2024-03-18 DIAGNOSIS — M25.579 CHRONIC ANKLE PAIN, UNSPECIFIED LATERALITY: ICD-10-CM

## 2024-03-18 DIAGNOSIS — M25.571 RIGHT ANKLE PAIN, UNSPECIFIED CHRONICITY: ICD-10-CM

## 2024-03-18 DIAGNOSIS — M67.02 CONTRACTURE OF LEFT ACHILLES TENDON DUE TO NON-NEUROLOGIC CAUSE: ICD-10-CM

## 2024-03-18 DIAGNOSIS — M76.822 POSTERIOR TIBIAL TENDON DYSFUNCTION, LEFT: Primary | ICD-10-CM

## 2024-03-18 DIAGNOSIS — M76.822 POSTERIOR TIBIAL TENDINITIS, LEFT: ICD-10-CM

## 2024-03-18 DIAGNOSIS — I10 ESSENTIAL HYPERTENSION: ICD-10-CM

## 2024-03-18 PROCEDURE — 73610 X-RAY EXAM OF ANKLE: CPT | Mod: RT

## 2024-03-18 PROCEDURE — 1036F TOBACCO NON-USER: CPT | Performed by: ORTHOPAEDIC SURGERY

## 2024-03-18 PROCEDURE — 3008F BODY MASS INDEX DOCD: CPT | Performed by: ORTHOPAEDIC SURGERY

## 2024-03-18 PROCEDURE — 99214 OFFICE O/P EST MOD 30 MIN: CPT | Performed by: ORTHOPAEDIC SURGERY

## 2024-03-18 PROCEDURE — 73610 X-RAY EXAM OF ANKLE: CPT | Mod: LEFT SIDE | Performed by: RADIOLOGY

## 2024-03-18 PROCEDURE — 73610 X-RAY EXAM OF ANKLE: CPT | Mod: LT

## 2024-03-18 ASSESSMENT — PAIN DESCRIPTION - DESCRIPTORS: DESCRIPTORS: DULL;SHARP

## 2024-03-18 ASSESSMENT — PAIN SCALES - GENERAL: PAINLEVEL_OUTOF10: 5 - MODERATE PAIN

## 2024-03-18 ASSESSMENT — PAIN - FUNCTIONAL ASSESSMENT: PAIN_FUNCTIONAL_ASSESSMENT: 0-10

## 2024-03-18 NOTE — PROGRESS NOTES
Hypertension Clinic   Jose Angel Winters was referred to the Clinical Pharmacy Team for his blood pressure management.    Referring Provider: Srinath Petty MD     HYPERTENSION ASSESSMENT    - Hasn't checked BP since we last spoke; last BP's in office for validation were very well controlled    CURRENT PHARMACOTHERAPY  - Lisinopril 20 mg daily    HISTORICAL PHARMACOTHERAPY  - None    Blood Pressure Monitor Validated: Yes  - Has a large arm cuff    SMBP MEASUREMENTS  Date Systolic Diastolic Heart Rate   -      -        Has the patient experienced any low blood pressures since last contact? No  - denies any  symptoms of low blood pressure: lightheadedness, dizziness, falls, blurry vision, clammy/pale skin   Has the patient experienced any high blood pressures since last contact? No  - denies any  symptoms of high blood pressure: headache, chest pain, vision problems    DEVICE ACCURACY TEST - 3/4/24  Care team should take five blood pressure readings using a combination of the patient's SMBP device and the office's method of blood pressure measurement.  STEP 1:    A Patient's Monitor left arm    114/75 mmHg     75 HR  B Patient's Monitor left arm     114/64 mmHg     71 HR  C Office's Monitor       right arm     110/72 mmHg     71 HR  D Patient's Monitor      right arm     111/75 mmHg     71 HR  E Office's Monitor       left arm     118/72 mmHg     72 HR    STEP 2:  Part 1: Average measurements B and D - 113/70  Part 2: Compare average of B and D to measurement C - 110/72  Part 3: If the difference is …  --> Less than 5 mm Hg, this device can be used for SMBP  --> Between 6 and 10 mm Hg, proceed to Step 3  --> Greater than 10 mm Hg, replace the device before proceeding with your SMBP program    STEP 3:  Part 1: Average measurements C and E - 114/72  Part 2: Compare average of C and E to measurement D - 111/75  Part 3: If the difference is …  --> Less than or equal to 10 mm Hg, this device can be used for SMBP  -->  "Greater than 10 mm Hg, replace the device before proceeding with your SMBP program    CONCLUSION: This BP monitor [ IS ] acceptable for home BP monitoring.     There were no vitals filed for this visit.    RELEVANT LAB RESULTS  Lab Results   Component Value Date    CREATININE 0.76 03/15/2024    BUN 10 03/15/2024     03/15/2024    K 4.4 03/15/2024     03/15/2024    CO2 28 03/15/2024     Lab Results   Component Value Date    CHOL 137 03/15/2024    CHOL 148 04/10/2023    CHOL 131 07/25/2022     Lab Results   Component Value Date    HDL 33.5 03/15/2024    HDL 35.7 (A) 04/10/2023    HDL 33.0 (A) 07/25/2022     Lab Results   Component Value Date    LDLCALC 79 03/15/2024     Lab Results   Component Value Date    TRIG 122 03/15/2024    TRIG 181 (H) 04/10/2023    TRIG 101 07/25/2022     No components found for: \"CHOLHDL\"  The ASCVD Risk score (Jeison LUNA, et al., 2019) failed to calculate for the following reasons:    Unable to determine if patient is Non-      PATIENT EDUCATION/GOALS  Blood pressure goal is less than 140/90 mmHg    Eat right: Your diet should be rich in fruits, vegetables, potassium, and low-fat dairy products. You should also reduce your intake of sodium and fats, particularly saturated fats.  Maintain a healthy weight: Try to achieve and maintain a healthy weight. If you are unsure what this means for you, please contact our clinic.  Exercise: Try to get at least 30 minutes of aerobic exercise every day.  Moderate your alcohol consumption: Limit your alcohol intake to one drink per day.  Monitor your blood pressure: You should check your blood pressure regularly. Notify our clinic if your blood pressure readings are consistently higher than recommended.    PROVIDER IMPRESSIONS/PLAN:    Assessment/Plan   Problem List Items Addressed This Visit             ICD-10-CM    Essential hypertension I10     1. Blood Pressure: well controlled.   - Blood Pressure monitor is " validated for at home use     2 Pharmacologic changes:   CONTINUE: Lisinopril 20 mg daily  Continue all meds under the continuation of care with the referring provider and clinical pharmacy team.     3. Counseling Points:  - I have counseled this patient on appropriate blood pressure monitor techniques, provided a blood pressure monitor log, and have advised the patient to contact me with questions regarding their blood pressure.     Pharmacist Follow Up: PRN; patient well controlled          Thank you,   Marissa Patrick, PharmD, [facility]    Continue all meds under the continuation of care with the referring provider and clinical pharmacy team.

## 2024-03-18 NOTE — ASSESSMENT & PLAN NOTE
1. Blood Pressure: well controlled.   - Blood Pressure monitor is validated for at home use     2 Pharmacologic changes:   CONTINUE: Lisinopril 20 mg daily  Continue all meds under the continuation of care with the referring provider and clinical pharmacy team.     3. Counseling Points:  - I have counseled this patient on appropriate blood pressure monitor techniques, provided a blood pressure monitor log, and have advised the patient to contact me with questions regarding their blood pressure.     Pharmacist Follow Up: PRN; patient well controlled

## 2024-03-29 ENCOUNTER — PHARMACY VISIT (OUTPATIENT)
Dept: PHARMACY | Facility: CLINIC | Age: 49
End: 2024-03-29
Payer: COMMERCIAL

## 2024-03-29 PROCEDURE — RXMED WILLOW AMBULATORY MEDICATION CHARGE

## 2024-04-01 PROCEDURE — RXMED WILLOW AMBULATORY MEDICATION CHARGE

## 2024-04-02 ENCOUNTER — PHARMACY VISIT (OUTPATIENT)
Dept: PHARMACY | Facility: CLINIC | Age: 49
End: 2024-04-02
Payer: COMMERCIAL

## 2024-04-06 PROCEDURE — RXMED WILLOW AMBULATORY MEDICATION CHARGE

## 2024-04-09 ENCOUNTER — PHARMACY VISIT (OUTPATIENT)
Dept: PHARMACY | Facility: CLINIC | Age: 49
End: 2024-04-09
Payer: COMMERCIAL

## 2024-04-19 PROCEDURE — RXMED WILLOW AMBULATORY MEDICATION CHARGE

## 2024-04-24 ENCOUNTER — PHARMACY VISIT (OUTPATIENT)
Dept: PHARMACY | Facility: CLINIC | Age: 49
End: 2024-04-24
Payer: COMMERCIAL

## 2024-04-26 ENCOUNTER — EVALUATION (OUTPATIENT)
Dept: PHYSICAL THERAPY | Facility: CLINIC | Age: 49
End: 2024-04-26
Payer: COMMERCIAL

## 2024-04-26 DIAGNOSIS — M25.571 RIGHT ANKLE PAIN, UNSPECIFIED CHRONICITY: Primary | ICD-10-CM

## 2024-04-26 DIAGNOSIS — M76.822 POSTERIOR TIBIAL TENDINITIS, LEFT: ICD-10-CM

## 2024-04-26 DIAGNOSIS — M76.822 POSTERIOR TIBIAL TENDON DYSFUNCTION, LEFT: ICD-10-CM

## 2024-04-26 DIAGNOSIS — M25.579 CHRONIC ANKLE PAIN, UNSPECIFIED LATERALITY: ICD-10-CM

## 2024-04-26 DIAGNOSIS — G89.29 CHRONIC ANKLE PAIN, UNSPECIFIED LATERALITY: ICD-10-CM

## 2024-04-26 DIAGNOSIS — M67.02 CONTRACTURE OF LEFT ACHILLES TENDON DUE TO NON-NEUROLOGIC CAUSE: ICD-10-CM

## 2024-04-26 PROCEDURE — 97110 THERAPEUTIC EXERCISES: CPT | Mod: GP | Performed by: PHYSICAL THERAPIST

## 2024-04-26 PROCEDURE — 97161 PT EVAL LOW COMPLEX 20 MIN: CPT | Mod: GP | Performed by: PHYSICAL THERAPIST

## 2024-04-26 ASSESSMENT — ENCOUNTER SYMPTOMS
OCCASIONAL FEELINGS OF UNSTEADINESS: 0
DEPRESSION: 0
LOSS OF SENSATION IN FEET: 0

## 2024-04-26 ASSESSMENT — PAIN SCALES - GENERAL: PAINLEVEL_OUTOF10: 0 - NO PAIN

## 2024-04-26 ASSESSMENT — PAIN - FUNCTIONAL ASSESSMENT: PAIN_FUNCTIONAL_ASSESSMENT: 0-10

## 2024-04-26 NOTE — PROGRESS NOTES
Physical Therapy  Physical Therapy Orthopedic Evaluation    Patient Name: Jose Angel Winters  MRN: [id number]  Today's Date: 4/26/2024  Time Calculation  Start Time: 0745  Stop Time: 0830  Time Calculation (min): 45 min    Visit Count: 1/30  Auth:No  Insurance:  consumer select      Current Problem  1. Right ankle pain, unspecified chronicity  Referral to Physical Therapy    Follow Up In Physical Therapy      2. Chronic ankle pain, unspecified laterality  Referral to Physical Therapy    Follow Up In Physical Therapy      3. Posterior tibial tendon dysfunction, left  Referral to Physical Therapy    Follow Up In Physical Therapy      4. Posterior tibial tendinitis, left  Referral to Physical Therapy    Follow Up In Physical Therapy      5. Contracture of left Achilles tendon due to non-neurologic cause  Referral to Physical Therapy    Follow Up In Physical Therapy          General:  General  Reason for Referral: L ankle pain  Referred By: Dr. Kimberley Rasmussen MD    Precautions:  Precautions  STEADI Fall Risk Score (The score of 4 or more indicates an increased risk of falling): 1    Pain:  Pain Assessment: 0-10  Pain Score: 0 - No pain  Pain Location: Ankle  Pain Orientation: Left    Subjective:   Pt is a 48 y.o. y.o male presenting to the clinic with L ankle pain. Reports that the symptoms have been present 12/1/23. Reports TIRSO insidious. Reports pain is currently 0/10, worst 5-6/10, best 0/10. Aggravated with weight bearing activities, prolong standing, stairs, improved/relieved with rest. Pt reports the pain is sharp/achy in nature. Pt reports the pain is in the medial arch. Reports that he had chronic L ankle sprains.  Denies any radiating symptoms or N/T. Reports that he just bought new shoes with moderate shoe support. Has had PT prior for his back pain. PMHx includes denies all. Denies any Red Flags.    Prior Level of Function (PLOF)  Patient previously independent with all ADLs  Exercise/Physical Activity: has    Work/School: .     Patients Living Environment: Reviewed and no concern    Primary Language: English    Patient's Goal(s) for Therapy: Return to pain free work and exercise.     Objective:  ANKLE    Ankle Palpation/Joint Mobility Assessment  Palpation/Joint Mobility Comment: TTP to distal posterior tibialis attachment  Ankle AROM  R ankle dorsiflexion: (10°): -5  L ankle dorsiflexion: (10°): -5  R ankle plantarflexion: (40°): 40  L ankle plantarflexion: (40°): 40  R ankle inversion: (30°): 30  L ankle inversion: (30°): 32  R ankle eversion: (20°): 10  L ankle eversion: (20°): 8 P!    Ankle MMT  R ankle dorsiflexion: (5/5): 5  L ankle dorsiflexion: (5/5): 5  R ankle plantarflexion: (5/5): 5  L ankle plantarflexion: (5/5): 4 P!  R ankle inversion: (5/5): 5  L ankle inversion: (5/5): 4 P!  R ankle eversion: (5/5): 5  L ankle eversion: (5/5): 5  Joint Mobility Testing  Joint Mobility Comment: Decreased subtalar and TC mobility Bilaterally    Flexibility  Flexibility Comment: decreased calf B    Effusion: 0    Posture: over pronation B.     Lower Extremity Functional Movements  Gait: Decreased gait speed, Decreased stride length, Decreased swing, Decreased stance time, contralateral pelvic drop, and overpronation  SL Balance: Dynamic knee valgus, contralateral pelvic drop, Increased pronation, and L>R  DL Squat: Increased knees over toes and Increased pronation  SL Squat: Dynamic Knee Valgus, Contralateral Pelvic Drop, Increased Pronation, and L>R      Outcome Measures:  Other Measures  Lower Extremity Funtional Score (LEFS): 35       Treatments:   Therapeutic Exercise  Therapeutic Exercise Activity 1: calf stretch x1 min  Therapeutic Exercise Activity 2: INV isometric 10x10 sec B  Therapeutic Exercise Activity 3: seated calf raises 3x12  Therapeutic Exercise Activity 4: towel curls 3x10 3 sec hold  Therapeutic Exercise Activity 5: doming 3x10 3 sec hold    Assessment: Pt is a 48 y.o. y.o male  presenting to the clinic with L ankle pain.  Pt demonstrates limitations in  foot and ankle Strength, ankle ROM, Flexibility of calf, Dynamic Motor Control, Balance, Gait Mechanics, and Pain. As a result, is limiting their ability to perform ADL's and their functional activities. Signs and symptoms present are consistent with posterior tibialis tendinopathy. Pt demonstrates to be a good candidate for PT, where the benefit would benefit from Strengthening, ROM, Stretching, Motor Control Training, Balance Training, and Gait Training, in order to return to PLOF.         EDUCATION: home exercise program, plan of care, activity modifications, pain management, and injury pathology   Access Code: [email or web address]  URL: [email or web address]  Date: 04/26/2024  Prepared by: Erickson Crump    Exercises  - Long Sitting Calf Stretch with Strap  - 1 x daily - 7 x weekly - 1-2 min hold  - Isometric Ankle Inversion at Wall  - 1 x daily - 7 x weekly - 10 reps - 10 sec hold  - Seated Heel Raise  - 1 x daily - 7 x weekly - 3 sets - 12 reps  - Towel Scrunches  - 1 x daily - 7 x weekly - 2 sets - 10 reps - 3 sec hold  - Seated Arch Lifts  - 1 x daily - 7 x weekly - 2 sets - 10 reps - 3 sec hold    Plan:   Treatment/Interventions: Aquatic therapy, Blood flow restriction therapy, Dry needling, Education/ Instruction, Electrical stimulation, Gait training, Hot pack, Manual therapy, Neuromuscular re-education, Orthosis fit/ training, Self care/ home management, Therapeutic activities, Therapeutic exercises, Vasopneumatic device  PT Plan: Skilled PT  PT Frequency: 1 time per week  Duration: 12 weeks  Onset Date: 04/26/24  Rehab Potential: Good  Plan of Care Agreement: Patient    Goals:  Active       PT Problem       PT Goal 1       Start:  04/26/24    Expected End:  06/07/24       Short Term Goals  1. Pt will demonstrated improvements in ankle strength, specifically to 4+/5 in 6 weeks, in order to progress back to PLOF.    2. Pt  will demonstrate the ability to balance on involved leg for 20-30 seconds, in order to demonstrate improvements dynamic ankle stability and control.     3. Pt will demonstrate the ability to walk/run for 10-30 minutes with minimal pain (2-3/10 pain) in 6 weeks, in order to progress back to PLOF.     4. Pt will demonstrate demonstrate the ability to work half a day (4 hours) with minimal pain (2-3/10 pain) in 6 weeks, in order to progress back to prior pain free work schedule.     5.Pt will demonstrate improvements in ankle A ROM, specifically >5 deg DF in 6 weeks, in order to improve functional level.     6. Pt will demonstrate Ind ability with HEP.            PT Goal 2       Start:  04/26/24    Expected End:  07/19/24         Long Term Goals  1. Pt will demonstrated improvements and symmetry in ankle strength, specifically to 5/5 in 12 weeks, in order to return to PLOF.    2. Pt will demonstrate symmetry in ankle A ROM, specifically in 12 weeks, in order to improve functional level.     3. Pt will demonstrate the ability to balance on involved leg for 45-60 seconds, in order to demonstrate sufficient ankle control for functional activities.     4. Pt will demonstrate the ability to return to pain free amb/running for all bouts in 12 weeks, in order to return to prior function.     5. Pt will demonstrate the ability to work a full day (8 hours) with minimal to no pain (0-1/10 pain) in 12 weeks, in order to return to prior work schedule.     6. LEFS >70/80 in 12 weeks.              Erickson Crump, PT, SCS, CSCS

## 2024-04-30 ENCOUNTER — TREATMENT (OUTPATIENT)
Dept: PHYSICAL THERAPY | Facility: CLINIC | Age: 49
End: 2024-04-30
Payer: COMMERCIAL

## 2024-04-30 DIAGNOSIS — M76.822 POSTERIOR TIBIAL TENDON DYSFUNCTION, LEFT: ICD-10-CM

## 2024-04-30 DIAGNOSIS — G89.29 CHRONIC ANKLE PAIN: Primary | ICD-10-CM

## 2024-04-30 DIAGNOSIS — M67.02 CONTRACTURE OF LEFT ACHILLES TENDON DUE TO NON-NEUROLOGIC CAUSE: ICD-10-CM

## 2024-04-30 DIAGNOSIS — M25.579 CHRONIC ANKLE PAIN: Primary | ICD-10-CM

## 2024-04-30 PROCEDURE — 97140 MANUAL THERAPY 1/> REGIONS: CPT | Mod: GP | Performed by: PHYSICAL THERAPIST

## 2024-04-30 PROCEDURE — 97110 THERAPEUTIC EXERCISES: CPT | Mod: GP | Performed by: PHYSICAL THERAPIST

## 2024-04-30 ASSESSMENT — PAIN - FUNCTIONAL ASSESSMENT: PAIN_FUNCTIONAL_ASSESSMENT: 0-10

## 2024-04-30 ASSESSMENT — PAIN SCALES - GENERAL: PAINLEVEL_OUTOF10: 0 - NO PAIN

## 2024-04-30 NOTE — PROGRESS NOTES
Physical Therapy  Physical Therapy Treatment    Patient Name: Jose Angel Winters  MRN: [id number]  Today's Date: 4/30/2024  Time Calculation  Start Time: 0745  Stop Time: 0825  Time Calculation (min): 40 min    Visit Count: 2/30  Auth:No  Insurance:  consumer select    Current Problem  1. Chronic ankle pain        2. Posterior tibial tendon dysfunction, left        3. Contracture of left Achilles tendon due to non-neurologic cause            General  Reason for Referral: L ankle pain  Referred By: Dr. Kimberley Rasmussen MD    Pain  Pain Assessment: 0-10  Pain Score: 0 - No pain  Pain Location: Ankle  Pain Orientation: Left    Subjective:   Patient reports that he feels like it slightly better.    HEP Compliance: fair.     Objective:   Over pronation during stance bilaterally    Treatments:   Therapeutic Exercise  Therapeutic Exercise Activity 1: step and hold INV 2x10 GTB  Therapeutic Exercise Activity 2: step and hold EV GTB 2x10  Therapeutic Exercise Activity 3: step up with airex pad  Therapeutic Exercise Activity 4: cone stepping non-reciprocal x3 laps  Therapeutic Exercise Activity 5: cone stepping reciprocal x3 laps  Therapeutic Exercise Activity 6: cone stepping lateral x3 laps  Therapeutic Exercise Activity 7: BOSU forward lunge 2x10  Therapeutic Exercise Activity 8: BOSU lateral lunge 2x10    Manual Therapy  Manual Therapy Activity 1: STM to plantar surface x10 min      Assessment: The focus of the session was Strengthening and Dynamic Stability Training. The pt demonstrated Good tolerance to the noted exercises today. The pt is demonstrated Good progress in skilled rehab at this time. Focused on proper foot alignment and dynamic stability exercises today. The pt demonstrated good tolerance, does demonstrate the tendency to overpronate. The pt does demonstrate the ability to self correct. The pt is still limited in overall Strength, Motor control, and Pain at this time. The pt continues to be a good candidate  for skilled PT, in order to further improve Strength, Motor control, and Pain.         Education: Continue with same HEP.     Plan: Continue with dynamic        Goals:  Active       PT Problem       PT Goal 1       Start:  04/26/24    Expected End:  06/07/24       Short Term Goals  1. Pt will demonstrated improvements in ankle strength, specifically to 4+/5 in 6 weeks, in order to progress back to PLOF.    2. Pt will demonstrate the ability to balance on involved leg for 20-30 seconds, in order to demonstrate improvements dynamic ankle stability and control.     3. Pt will demonstrate the ability to walk/run for 10-30 minutes with minimal pain (2-3/10 pain) in 6 weeks, in order to progress back to PLOF.     4. Pt will demonstrate demonstrate the ability to work half a day (4 hours) with minimal pain (2-3/10 pain) in 6 weeks, in order to progress back to prior pain free work schedule.     5.Pt will demonstrate improvements in ankle A ROM, specifically >5 deg DF in 6 weeks, in order to improve functional level.     6. Pt will demonstrate Ind ability with HEP.            PT Goal 2       Start:  04/26/24    Expected End:  07/19/24         Long Term Goals  1. Pt will demonstrated improvements and symmetry in ankle strength, specifically to 5/5 in 12 weeks, in order to return to PLOF.    2. Pt will demonstrate symmetry in ankle A ROM, specifically in 12 weeks, in order to improve functional level.     3. Pt will demonstrate the ability to balance on involved leg for 45-60 seconds, in order to demonstrate sufficient ankle control for functional activities.     4. Pt will demonstrate the ability to return to pain free amb/running for all bouts in 12 weeks, in order to return to prior function.     5. Pt will demonstrate the ability to work a full day (8 hours) with minimal to no pain (0-1/10 pain) in 12 weeks, in order to return to prior work schedule.     6. LEFS >70/80 in 12 weeks.              Erickson Crmup, PT, SCS,  CSCS

## 2024-05-10 ENCOUNTER — PHARMACY VISIT (OUTPATIENT)
Dept: PHARMACY | Facility: CLINIC | Age: 49
End: 2024-05-10
Payer: COMMERCIAL

## 2024-05-10 PROCEDURE — RXMED WILLOW AMBULATORY MEDICATION CHARGE

## 2024-05-14 ENCOUNTER — TREATMENT (OUTPATIENT)
Dept: PHYSICAL THERAPY | Facility: CLINIC | Age: 49
End: 2024-05-14
Payer: COMMERCIAL

## 2024-05-14 DIAGNOSIS — M76.822 POSTERIOR TIBIAL TENDON DYSFUNCTION, LEFT: ICD-10-CM

## 2024-05-14 DIAGNOSIS — M76.822 POSTERIOR TIBIAL TENDINITIS, LEFT: ICD-10-CM

## 2024-05-14 DIAGNOSIS — M25.579 CHRONIC ANKLE PAIN, UNSPECIFIED LATERALITY: ICD-10-CM

## 2024-05-14 DIAGNOSIS — G89.29 CHRONIC ANKLE PAIN, UNSPECIFIED LATERALITY: ICD-10-CM

## 2024-05-14 DIAGNOSIS — M67.02 CONTRACTURE OF LEFT ACHILLES TENDON DUE TO NON-NEUROLOGIC CAUSE: ICD-10-CM

## 2024-05-14 DIAGNOSIS — M25.571 RIGHT ANKLE PAIN, UNSPECIFIED CHRONICITY: ICD-10-CM

## 2024-05-14 PROCEDURE — 97110 THERAPEUTIC EXERCISES: CPT | Mod: GP

## 2024-05-14 ASSESSMENT — PAIN SCALES - GENERAL: PAINLEVEL_OUTOF10: 0 - NO PAIN

## 2024-05-14 ASSESSMENT — PAIN - FUNCTIONAL ASSESSMENT: PAIN_FUNCTIONAL_ASSESSMENT: 0-10

## 2024-05-14 NOTE — PROGRESS NOTES
Physical Therapy  Physical Therapy Treatment    Patient Name: Jose Angel Winters  MRN: [id number]  Today's Date: 5/14/2024  Time Calculation  Start Time: 0904  Stop Time: 0945  Time Calculation (min): 41 min    Visit Count: 3/30  Auth:No  Insurance:  consumer select    Current Problem  1. Right ankle pain, unspecified chronicity  Follow Up In Physical Therapy      2. Chronic ankle pain, unspecified laterality  Follow Up In Physical Therapy      3. Posterior tibial tendon dysfunction, left  Follow Up In Physical Therapy      4. Posterior tibial tendinitis, left  Follow Up In Physical Therapy      5. Contracture of left Achilles tendon due to non-neurologic cause  Follow Up In Physical Therapy            General  Reason for Referral: L ankle pain  Referred By: Dr. Kimberley Rasmussen MD    Pain  Pain Assessment: 0-10  Pain Score: 0 - No pain  Pain Location: Ankle  Pain Orientation: Left    Subjective:   Patient reports that he felt the repercussions from mowing the lawn in old avia running shoes that really bothered tendon and left it feeling 7-8/10 at night.    HEP Compliance: fair.     Objective:   Over pronation during stance bilaterally    Treatments:     Therapeutic Exercise  Therapeutic Exercise Activity 1: Post tib isos ([id number] full DF and ev)  Therapeutic Exercise Activity 2: Post AROM (x20)  Therapeutic Exercise Activity 3: towel srunch 3x20  Therapeutic Exercise Activity 4: ball iso post tib 3x20  Therapeutic Exercise Activity 5: band monster walk GTB (2x5-4-3-2-1)  Therapeutic Exercise Activity 6: seated calf raise 3x20 (add inv: 50lbs)  Therapeutic Exercise Activity 7: SLS with opp toe tap inv pert x12 (BW-YTB)           Assessment:   The focus of the session was Strengthening, ROM, Stretching, Balance, Motor Control, and Dynamic Stability Training. The pt demonstrated Good and Improving tolerance to the noted exercises today. The pt is demonstrated Good and Improving progress in skilled rehab at this time  with displayed improved tolerance of loading of the posterior in [place] and CKC. The pt is still limited in overall Strength, ROM, Flexibility, Motor control, Balance, and Pain at this time. The pt continues to be a good candidate for skilled PT, in order to further improve Strength, ROM, Flexibility, Motor control, Balance, and Pain.        Education: Continue with same HEP.     Plan: Continue with dynamic stability add doming exercises    Quinn Saldana PT, DPT #[id number]

## 2024-05-21 ENCOUNTER — TREATMENT (OUTPATIENT)
Dept: PHYSICAL THERAPY | Facility: CLINIC | Age: 49
End: 2024-05-21
Payer: COMMERCIAL

## 2024-05-21 DIAGNOSIS — M25.579 CHRONIC ANKLE PAIN, UNSPECIFIED LATERALITY: ICD-10-CM

## 2024-05-21 DIAGNOSIS — M76.822 POSTERIOR TIBIAL TENDON DYSFUNCTION, LEFT: ICD-10-CM

## 2024-05-21 DIAGNOSIS — M76.822 POSTERIOR TIBIAL TENDINITIS, LEFT: ICD-10-CM

## 2024-05-21 DIAGNOSIS — M67.02 CONTRACTURE OF LEFT ACHILLES TENDON DUE TO NON-NEUROLOGIC CAUSE: ICD-10-CM

## 2024-05-21 DIAGNOSIS — G89.29 CHRONIC ANKLE PAIN, UNSPECIFIED LATERALITY: ICD-10-CM

## 2024-05-21 DIAGNOSIS — M25.571 RIGHT ANKLE PAIN, UNSPECIFIED CHRONICITY: ICD-10-CM

## 2024-05-21 PROCEDURE — 97112 NEUROMUSCULAR REEDUCATION: CPT | Mod: GP

## 2024-05-21 PROCEDURE — 97110 THERAPEUTIC EXERCISES: CPT | Mod: GP

## 2024-05-21 ASSESSMENT — PAIN SCALES - GENERAL: PAINLEVEL_OUTOF10: 2

## 2024-05-21 ASSESSMENT — PAIN - FUNCTIONAL ASSESSMENT: PAIN_FUNCTIONAL_ASSESSMENT: 0-10

## 2024-05-21 NOTE — PROGRESS NOTES
Physical Therapy  Physical Therapy Treatment    Patient Name: Jose Angel Winters  MRN: [id number]  Today's Date: 5/21/2024  Time Calculation  Start Time: 0817  Stop Time: 0902  Time Calculation (min): 45 min    Visit Count: 4/30  Auth:No  Insurance:  consumer select    Current Problem  1. Right ankle pain, unspecified chronicity  Follow Up In Physical Therapy      2. Chronic ankle pain, unspecified laterality  Follow Up In Physical Therapy      3. Posterior tibial tendon dysfunction, left  Follow Up In Physical Therapy      4. Posterior tibial tendinitis, left  Follow Up In Physical Therapy      5. Contracture of left Achilles tendon due to non-neurologic cause  Follow Up In Physical Therapy              General  Reason for Referral: L ankle pain  Referred By: Dr. Kimberley Rasmussen MD    Pain  Pain Assessment: 0-10  Pain Score: 2 (posterior tibialis insertion)  Pain Location: Ankle  Pain Orientation: Left    Subjective:   Patient reports doing better than last time, still experiencing pain at night. Personal training going well focused on upper body yesterday.  HEP Compliance: fair.     Objective:   Over pronation during stance and gait bilaterally , better wbing control of pronation on R, but poor toe flexor activation on R compared to L.    Treatments:     Therapeutic Exercise  Therapeutic Exercise Activity 1: upright bike x5 min w/ 8 res seat1  Therapeutic Exercise Activity 2: banded tib IR with doming (2x10-5sec RTB)  Therapeutic Exercise Activity 3: banded toe ABD step out (bw + YTB 2x10 - 3 sec hold)  Therapeutic Exercise Activity 4: ball iso post tib 3x20  Therapeutic Exercise Activity 5: band monster walk GTB (2x5-4-3-2-1)  Therapeutic Exercise Activity 6: staggered stance cable pronation control       [id number]    Assessment:   The focus of the session was Strengthening, ROM, Balance, Motor Control, and Dynamic Stability Training. The pt demonstrated Fair and Improving tolerance to the noted exercises today.  The pt is demonstrated Fair and Improving progress in skilled rehab at this time. The pt is still limited in overall Strength, ROM, Flexibility, Motor control, Balance, Gait mechanics, and Pain at this time. The pt continues to be a good candidate for skilled PT, in order to further improve Strength, ROM, Flexibility, Motor control, Balance, Gait mechanics, and Pain. Pt shows difficulty controlling pronation bilaterally and limited tolerance of L post tib loading.          Education: Continue with same HEP.     Plan: Continue with dynamic stability add doming exercises    Quinn Saldana PT, DPT #[id number]

## 2024-06-05 ENCOUNTER — TREATMENT (OUTPATIENT)
Dept: PHYSICAL THERAPY | Facility: CLINIC | Age: 49
End: 2024-06-05
Payer: COMMERCIAL

## 2024-06-05 DIAGNOSIS — M25.579 CHRONIC ANKLE PAIN, UNSPECIFIED LATERALITY: ICD-10-CM

## 2024-06-05 DIAGNOSIS — M67.02 CONTRACTURE OF LEFT ACHILLES TENDON DUE TO NON-NEUROLOGIC CAUSE: ICD-10-CM

## 2024-06-05 DIAGNOSIS — M25.571 RIGHT ANKLE PAIN, UNSPECIFIED CHRONICITY: ICD-10-CM

## 2024-06-05 DIAGNOSIS — M76.822 POSTERIOR TIBIAL TENDON DYSFUNCTION, LEFT: ICD-10-CM

## 2024-06-05 DIAGNOSIS — M76.822 POSTERIOR TIBIAL TENDINITIS, LEFT: ICD-10-CM

## 2024-06-05 DIAGNOSIS — G89.29 CHRONIC ANKLE PAIN, UNSPECIFIED LATERALITY: ICD-10-CM

## 2024-06-05 PROCEDURE — 97112 NEUROMUSCULAR REEDUCATION: CPT | Mod: GP,CQ

## 2024-06-05 PROCEDURE — 97016 VASOPNEUMATIC DEVICE THERAPY: CPT | Mod: GP,CQ

## 2024-06-05 PROCEDURE — 97110 THERAPEUTIC EXERCISES: CPT | Mod: GP,CQ

## 2024-06-05 ASSESSMENT — PAIN - FUNCTIONAL ASSESSMENT: PAIN_FUNCTIONAL_ASSESSMENT: 0-10

## 2024-06-05 ASSESSMENT — PAIN SCALES - GENERAL: PAINLEVEL_OUTOF10: 1

## 2024-06-05 NOTE — PROGRESS NOTES
Physical Therapy  Physical Therapy Treatment    Patient Name: Jose Angel Winters  MRN: [id number]  Today's Date: 6/5/2024  Time Calculation  Start Time: 0835  Stop Time: 0920  Time Calculation (min): 45 min    Insurance:  Visit number: 5 of 30  Authorization info: Auth not needed  Insurance Type:  Consumer Select  Cert date start:  Cert date end:                 General       Current Problem  1. Right ankle pain, unspecified chronicity  Follow Up In Physical Therapy      2. Chronic ankle pain, unspecified laterality  Follow Up In Physical Therapy      3. Posterior tibial tendon dysfunction, left  Follow Up In Physical Therapy      4. Posterior tibial tendinitis, left  Follow Up In Physical Therapy      5. Contracture of left Achilles tendon due to non-neurologic cause  Follow Up In Physical Therapy               Pain Assessment: 0-10  Pain Score: 1  Pain Location: Ankle  Pain Orientation: Left    Subjective:   Patient reports that he is still having some discomfort in his left posterior tibialis tendon of about a 1.  HEP Performed:  Yes    Objective:   Presents with bilateral foot pronation.    Treatments:   DBE 2x1.5'  KB 4kg tandem stand R/L, L/R cw/ccw- 1' ea dir.  Wt. Bd- DF/PF 17.5#/17.5#  IN/EV 10#/5#- 1.5' ea.  Cybex ankle 3 pos- DF 10#, IN 7.5#, EV 5# x 20 ea.  Swisswing xiomara feet- 2'  Arches taped for prelude to Foot-tech inserts  GR Lt. Ankle (med)- 15'    Charges: TE 1, NME 1, Vaso (CQ Modifier)    Assessment:   Had some difficulty with the weight board 2° posterior tibialis weakness.    Plan:   Continue decreasing left posterior tibialis discomfort increasing arch strength and fabricating Foot-tech inserts for better balance and foot function.    Krishna Eldridge, PTA

## 2024-06-12 ENCOUNTER — TREATMENT (OUTPATIENT)
Dept: PHYSICAL THERAPY | Facility: CLINIC | Age: 49
End: 2024-06-12
Payer: COMMERCIAL

## 2024-06-12 DIAGNOSIS — G89.29 CHRONIC ANKLE PAIN, UNSPECIFIED LATERALITY: ICD-10-CM

## 2024-06-12 DIAGNOSIS — M67.02 CONTRACTURE OF LEFT ACHILLES TENDON DUE TO NON-NEUROLOGIC CAUSE: ICD-10-CM

## 2024-06-12 DIAGNOSIS — M76.822 POSTERIOR TIBIAL TENDON DYSFUNCTION, LEFT: ICD-10-CM

## 2024-06-12 DIAGNOSIS — M25.579 CHRONIC ANKLE PAIN, UNSPECIFIED LATERALITY: ICD-10-CM

## 2024-06-12 DIAGNOSIS — M76.822 POSTERIOR TIBIAL TENDINITIS, LEFT: ICD-10-CM

## 2024-06-12 DIAGNOSIS — M25.571 RIGHT ANKLE PAIN, UNSPECIFIED CHRONICITY: ICD-10-CM

## 2024-06-12 PROCEDURE — 97110 THERAPEUTIC EXERCISES: CPT | Mod: GP,CQ

## 2024-06-12 PROCEDURE — 97112 NEUROMUSCULAR REEDUCATION: CPT | Mod: GP,CQ

## 2024-06-12 PROCEDURE — 97016 VASOPNEUMATIC DEVICE THERAPY: CPT | Mod: GP,CQ

## 2024-06-12 ASSESSMENT — PAIN SCALES - GENERAL: PAINLEVEL_OUTOF10: 1

## 2024-06-12 ASSESSMENT — PAIN - FUNCTIONAL ASSESSMENT: PAIN_FUNCTIONAL_ASSESSMENT: 0-10

## 2024-06-12 NOTE — PROGRESS NOTES
Physical Therapy  Physical Therapy Treatment    Patient Name: Jose nAgel Winters  MRN: [id number]  Today's Date: 6/12/2024  Time Calculation  Start Time: 0830  Stop Time: 0925  Time Calculation (min): 55 min    Insurance:  Visit number: 6 of 30  Authorization info: Auth not needed  Insurance Type:  Consumer [employer]  Cert date start:  Cert date end:                 General   Reason for Referral: L ankle pain   Referred By: Dr. Kimberley Rasmussen MD    Current Problem  1. Right ankle pain, unspecified chronicity  Follow Up In Physical Therapy      2. Chronic ankle pain, unspecified laterality  Follow Up In Physical Therapy      3. Posterior tibial tendon dysfunction, left  Follow Up In Physical Therapy      4. Posterior tibial tendinitis, left  Follow Up In Physical Therapy      5. Contracture of left Achilles tendon due to non-neurologic cause  Follow Up In Physical Therapy               Pain Assessment: 0-10  Pain Score: 1  Pain Location: Ankle  Pain Orientation: Left    Subjective:   Patient reports that he isn't feeling too bad.  Pain is a 1.  HEP Performed:  Yes    Objective:   Presents with bilateral foot pronation.    Treatments:   Bike (seat#2)- 5'  DBE 2x1.5'  Bosu lunge alternating- 1.5'  Biodex balance LOS L12- 45%, 71%  KB 4kg tandem stand R/L, L/R cw/ccw- 1' ea dir.  Toe press 50# 3 pos x 20 ea.  Wt. Bd- DF/PF 17.5#/17.5#  IN/EV 15#/7.5#- 1.5' ea.  Cybex ankle 3 pos- DF 12.5#, IN 7.5#, EV 5# x 30 ea.  Toe/Heel walk 40'x2 ea.  SS Xiomara Gastroc/Soleus (black wedge)- 1' ea.  Swisswing xiomara Gastroc/Soleus/Feet- 2'  GR Lt. Ankle (med)- 15'    Charges: TE 2, NME 1, Vaso (CQ Modifier)    Assessment:   Managed all the exercises much better today than last visit.    Plan:   Continue decreasing left posterior tibialis discomfort increasing arch strength and fabricating Foot-tech inserts for better balance and foot function.    Krishna Eldridge, PTA

## 2024-06-16 PROCEDURE — RXMED WILLOW AMBULATORY MEDICATION CHARGE

## 2024-06-18 ENCOUNTER — TREATMENT (OUTPATIENT)
Dept: PHYSICAL THERAPY | Facility: CLINIC | Age: 49
End: 2024-06-18
Payer: COMMERCIAL

## 2024-06-18 DIAGNOSIS — G89.29 CHRONIC ANKLE PAIN: Primary | ICD-10-CM

## 2024-06-18 DIAGNOSIS — M25.579 CHRONIC ANKLE PAIN: Primary | ICD-10-CM

## 2024-06-18 DIAGNOSIS — M67.02 CONTRACTURE OF LEFT ACHILLES TENDON DUE TO NON-NEUROLOGIC CAUSE: ICD-10-CM

## 2024-06-18 DIAGNOSIS — M76.822 POSTERIOR TIBIAL TENDON DYSFUNCTION, LEFT: ICD-10-CM

## 2024-06-18 PROCEDURE — L3030 FOOT ARCH SUPPORT REMOV PREM: HCPCS | Performed by: SPECIALIST/TECHNOLOGIST

## 2024-06-18 ASSESSMENT — PAIN SCALES - GENERAL: PAINLEVEL_OUTOF10: 1

## 2024-06-18 ASSESSMENT — PAIN - FUNCTIONAL ASSESSMENT: PAIN_FUNCTIONAL_ASSESSMENT: 0-10

## 2024-06-18 NOTE — PROGRESS NOTES
Physical Therapy  Physical Therapy Treatment    Patient Name: Jose Angel Winters  MRN: [id number]  Today's Date: 6/18/2024  Time Calculation  Start Time: 0830  Stop Time: 0855  Time Calculation (min): 25 min    Current Problem  1. Chronic ankle pain        2. Posterior tibial tendon dysfunction, left        3. Contracture of left Achilles tendon due to non-neurologic cause            Precautions  Precautions  Precautions Comment: none  Pain  Pain Assessment: 0-10  Pain Score: 1  Pain Location: Ankle  Pain Orientation: Left    Insurance:   Visit: 7 of 30  Authorization: No Auth needed    Consumer select    Subjective:   Subjective   Patient arrived full weight bearing without a limp noting issues with arches.  Patient requests custom orthotics.      Objective:   B pronation  B davidson's toe         Treatments:     fabricated Foot Support tech lady's size 9 (Fabrifit/XRD) with thermal cork add on  reviewed wear and care directions  reviewed modes of failure     Charges:  FT x2 (cq)     Assessment: Patient noted feeling support immediately upon fabrication.         Plan: Continue therapy at [facility] per plan of care developed by Erickson Crump PT and continued by MD Jacques Maynard PTA

## 2024-06-19 ENCOUNTER — TREATMENT (OUTPATIENT)
Dept: PHYSICAL THERAPY | Facility: CLINIC | Age: 49
End: 2024-06-19
Payer: COMMERCIAL

## 2024-06-19 DIAGNOSIS — M25.579 CHRONIC ANKLE PAIN, UNSPECIFIED LATERALITY: ICD-10-CM

## 2024-06-19 DIAGNOSIS — G89.29 CHRONIC ANKLE PAIN, UNSPECIFIED LATERALITY: ICD-10-CM

## 2024-06-19 DIAGNOSIS — M67.02 CONTRACTURE OF LEFT ACHILLES TENDON DUE TO NON-NEUROLOGIC CAUSE: ICD-10-CM

## 2024-06-19 DIAGNOSIS — M76.822 POSTERIOR TIBIAL TENDINITIS, LEFT: ICD-10-CM

## 2024-06-19 DIAGNOSIS — M25.571 RIGHT ANKLE PAIN, UNSPECIFIED CHRONICITY: ICD-10-CM

## 2024-06-19 DIAGNOSIS — M76.822 POSTERIOR TIBIAL TENDON DYSFUNCTION, LEFT: ICD-10-CM

## 2024-06-19 PROCEDURE — 97112 NEUROMUSCULAR REEDUCATION: CPT | Mod: GP,CQ

## 2024-06-19 PROCEDURE — 97110 THERAPEUTIC EXERCISES: CPT | Mod: GP,CQ

## 2024-06-19 PROCEDURE — RXMED WILLOW AMBULATORY MEDICATION CHARGE

## 2024-06-19 PROCEDURE — 97016 VASOPNEUMATIC DEVICE THERAPY: CPT | Mod: GP,CQ

## 2024-06-19 ASSESSMENT — PAIN - FUNCTIONAL ASSESSMENT: PAIN_FUNCTIONAL_ASSESSMENT: 0-10

## 2024-06-19 ASSESSMENT — PAIN SCALES - GENERAL: PAINLEVEL_OUTOF10: 1

## 2024-06-19 NOTE — PROGRESS NOTES
"Physical Therapy  Physical Therapy Treatment    Patient Name: Jose Angel Winters  MRN: [id number]  Today's Date: 6/19/2024  Time Calculation  Start Time: 0835  Stop Time: 0930  Time Calculation (min): 55 min    Insurance:  Visit number: 8 of 30  Authorization info: Auth not needed  Insurance Type:  Consumer Select  Cert date start:  Cert date end:                 General   Reason for Referral: L ankle pain   Referred By: Dr. Kimberley Rasmussen MD    Current Problem  1. Right ankle pain, unspecified chronicity  Follow Up In Physical Therapy      2. Chronic ankle pain, unspecified laterality  Follow Up In Physical Therapy      3. Posterior tibial tendon dysfunction, left  Follow Up In Physical Therapy      4. Posterior tibial tendinitis, left  Follow Up In Physical Therapy      5. Contracture of left Achilles tendon due to non-neurologic cause  Follow Up In Physical Therapy          Precautions  Precautions Comment: none    Pain Assessment: 0-10  Pain Score: 1  Pain Location: Ankle  Pain Orientation: Left    Subjective:   Patient reports that his new Foot-techs make his feet feel like he is rolling to the outside in his shoes.  Pain is still a 1.  HEP Performed:  Yes    Objective:   Presents with bilateral foot pronation.    Treatments:   Bike (seat#2)- 5'  DBE 2x1.5'  Bosu lunge alternating- 1.5'  Biodex balance LOS L12- 43%, 67% (hand assist)  KB 4kg tandem stand R/L, L/R cw/ccw- 1' ea dir.  Toe press 50# 3 pos x 20 ea.  Wt. Bd- DF/PF 17.5#/17.5#  IN/EV 17.5#/12.5#- 1.5' ea.  Cybex ankle 3 pos- DF 17.5#, IN 10#, EV 7.5# x 20 ea.  Toe/Heel walk 40'x2 ea.  SS Lasha Gastroc/Soleus (black wedge)- 1' ea.  Swisswing lasha Gastroc/Soleus/Feet- 2'  GR Lt. Ankle (med)- 15'    Charges: TE 2, NME 1, Vaso (CQ Modifier)    Assessment:   Did complain during the session that his feet felt like he was \"rolling outward\" from the new Foot-techs.  Otherwise no problems noted.    Plan:   Continue decreasing left posterior tibialis discomfort " increasing arch strength and fabricating Foot-tech inserts for better balance and foot function.    Krishna Eldridge, PTA

## 2024-06-21 ENCOUNTER — PHARMACY VISIT (OUTPATIENT)
Dept: PHARMACY | Facility: CLINIC | Age: 49
End: 2024-06-21
Payer: COMMERCIAL

## 2024-06-26 ENCOUNTER — TREATMENT (OUTPATIENT)
Dept: PHYSICAL THERAPY | Facility: CLINIC | Age: 49
End: 2024-06-26
Payer: COMMERCIAL

## 2024-06-26 DIAGNOSIS — M76.822 POSTERIOR TIBIAL TENDON DYSFUNCTION, LEFT: ICD-10-CM

## 2024-06-26 DIAGNOSIS — G89.29 CHRONIC ANKLE PAIN, UNSPECIFIED LATERALITY: ICD-10-CM

## 2024-06-26 DIAGNOSIS — M67.02 CONTRACTURE OF LEFT ACHILLES TENDON DUE TO NON-NEUROLOGIC CAUSE: ICD-10-CM

## 2024-06-26 DIAGNOSIS — M25.571 RIGHT ANKLE PAIN, UNSPECIFIED CHRONICITY: ICD-10-CM

## 2024-06-26 DIAGNOSIS — M25.579 CHRONIC ANKLE PAIN, UNSPECIFIED LATERALITY: ICD-10-CM

## 2024-06-26 DIAGNOSIS — M76.822 POSTERIOR TIBIAL TENDINITIS, LEFT: ICD-10-CM

## 2024-06-26 PROCEDURE — 97110 THERAPEUTIC EXERCISES: CPT | Mod: GP

## 2024-06-26 PROCEDURE — 97112 NEUROMUSCULAR REEDUCATION: CPT | Mod: GP

## 2024-06-26 ASSESSMENT — PAIN - FUNCTIONAL ASSESSMENT: PAIN_FUNCTIONAL_ASSESSMENT: 0-10

## 2024-06-26 ASSESSMENT — PAIN SCALES - GENERAL: PAINLEVEL_OUTOF10: 1

## 2024-06-26 NOTE — PROGRESS NOTES
Physical Therapy  Physical Therapy Treatment    Patient Name: Jose Angel Winters  MRN: [id number]  Today's Date: 6/26/2024  Time Calculation  Start Time: 0831  Stop Time: 0919  Time Calculation (min): 48 min    Insurance:  Visit number: 9 of 30  Authorization info: Auth not needed  Insurance Type:  Consumer [employer]              General  Reason for Referral: L ankle pain  Referred By: Dr. Kimberley Rasmussen MDReason for Referral: L ankle pain   Referred By: Dr. Kimberley Rasmussen MD    Current Problem  1. Right ankle pain, unspecified chronicity  Follow Up In Physical Therapy      2. Chronic ankle pain, unspecified laterality  Follow Up In Physical Therapy      3. Posterior tibial tendon dysfunction, left  Follow Up In Physical Therapy      4. Posterior tibial tendinitis, left  Follow Up In Physical Therapy      5. Contracture of left Achilles tendon due to non-neurologic cause  Follow Up In Physical Therapy            Precautions  Precautions Comment: none    Pain Assessment: 0-10  0-10 (Numeric) Pain Score: 1  Pain Location: Ankle  Pain Orientation: Left    Subjective:   Patient reports feeling like he is slowly still making progress, arch has some increase in ache due to orthotic and adjusting to it.  HEP Performed:  partially    Objective:  Pain with combo of controlled inversion + PF  Difficulty with glute contraction  Treatments:   Bike (seat#1)- 5 min res 7  Rocker board 2x 1min ea: f-b/s-s  Airex bal:  BAPS: [id number] ea cw-ccw level 3 unable to do level 4  TIB IR band hold w/ glute squeeze RTB- 2x10-5sec  Banded hip opener: dbl org 2x10-5sec  Charges: TE 1, NMR 2    Assessment:   The focus of the session was Strengthening, ROM, Motor Control, and Dynamic Stability Training. The pt demonstrated Fair tolerance to the noted exercises today. The pt is demonstrated Fair progress in skilled rehab at this time. The pt is still limited in overall Strength, ROM, Flexibility, Motor control, Balance, Gait mechanics, and Pain at  this time. The pt continues to be a good candidate for skilled PT, in order to further improve Strength, ROM, Flexibility, Motor control, Balance, Gait mechanics, and Pain.       Plan:   Continue decreasing left posterior tibialis discomfort increasing arch strength and fabricating Foot-tech inserts for better balance and foot function.    Quinn Saldana, PT

## 2024-07-05 PROCEDURE — RXMED WILLOW AMBULATORY MEDICATION CHARGE

## 2024-07-09 PROCEDURE — RXMED WILLOW AMBULATORY MEDICATION CHARGE

## 2024-07-11 ENCOUNTER — PHARMACY VISIT (OUTPATIENT)
Dept: PHARMACY | Facility: CLINIC | Age: 49
End: 2024-07-11
Payer: COMMERCIAL

## 2024-07-12 ENCOUNTER — TREATMENT (OUTPATIENT)
Dept: PHYSICAL THERAPY | Facility: CLINIC | Age: 49
End: 2024-07-12
Payer: COMMERCIAL

## 2024-07-12 DIAGNOSIS — M67.02 CONTRACTURE OF LEFT ACHILLES TENDON DUE TO NON-NEUROLOGIC CAUSE: ICD-10-CM

## 2024-07-12 DIAGNOSIS — M76.822 POSTERIOR TIBIAL TENDON DYSFUNCTION, LEFT: ICD-10-CM

## 2024-07-12 DIAGNOSIS — M76.822 POSTERIOR TIBIAL TENDINITIS, LEFT: ICD-10-CM

## 2024-07-12 DIAGNOSIS — G89.29 CHRONIC ANKLE PAIN, UNSPECIFIED LATERALITY: ICD-10-CM

## 2024-07-12 DIAGNOSIS — M25.579 CHRONIC ANKLE PAIN, UNSPECIFIED LATERALITY: ICD-10-CM

## 2024-07-12 DIAGNOSIS — M25.571 RIGHT ANKLE PAIN, UNSPECIFIED CHRONICITY: Primary | ICD-10-CM

## 2024-07-12 PROCEDURE — 97110 THERAPEUTIC EXERCISES: CPT | Mod: GP,CQ

## 2024-07-12 ASSESSMENT — PAIN - FUNCTIONAL ASSESSMENT: PAIN_FUNCTIONAL_ASSESSMENT: 0-10

## 2024-07-12 ASSESSMENT — PAIN SCALES - GENERAL: PAINLEVEL_OUTOF10: 0 - NO PAIN

## 2024-07-12 NOTE — PROGRESS NOTES
"Physical Therapy  Physical Therapy Treatment    Patient Name: Jose Angel Winters  MRN: [id number]  Today's Date: 7/12/2024  Time Calculation  Start Time: 0745  Stop Time: 0825  Time Calculation (min): 40 min    Insurance:  Visit number: 10 of 30  Authorization info: Auth not needed  Insurance Type:  Consumer [employer]              General  Reason for Referral: L ankle pain  Referred By: Dr. Kimberley Rasmussen MD    Current Problem  1. Right ankle pain, unspecified chronicity  Follow Up In Physical Therapy      2. Chronic ankle pain, unspecified laterality  Follow Up In Physical Therapy      3. Posterior tibial tendon dysfunction, left  Follow Up In Physical Therapy      4. Posterior tibial tendinitis, left  Follow Up In Physical Therapy      5. Contracture of left Achilles tendon due to non-neurologic cause  Follow Up In Physical Therapy              Precautions  Precautions Comment: none    Pain Assessment: 0-10  0-10 (Numeric) Pain Score: 0 - No pain  Pain Location: Ankle  Pain Orientation: Left    Subjective:   Patient reports that resting pain is always around 0-1 aside from extended periods of standing +10 minutes, end of the day or performing stair ambulation without shoes on.   HEP Performed:  partially limited by \"time and space in home\"     Objective:  Partial knee valgus during squat    Treatments:   Upright Bike (seat#1)- 5 min res 7  Soleus stretch stairs 1'   Gastroc stretch stairs 1'  Hamstring stretch stairs 1'   Banded adduction with 45o line of force to bias PPT 2x20 GTB 1st set Blue (added to HEP)   Supine hip flexor stretch 1'   Hip adductor stretch 30\"   Squats 3x10 blue thera-band   Standing HR with tennis ball squeeze between feet 8x2 5\" eccentrics.   Sole to sole isometric 5x 5\"   Charges: TE 2, NMR 1    Assessment:   Pt tolerated treatment session fairly having LE musculature soreness from private personal training leg exercises from the previous day. Pt reported minor relief post hip flexor " stretching.     Plan:   Continue decreasing left posterior tibialis discomfort increasing arch strength, assess stair ambulation mechanics bare foot.      Marcin Ambrocio, PTA

## 2024-07-15 PROCEDURE — RXMED WILLOW AMBULATORY MEDICATION CHARGE

## 2024-07-17 ENCOUNTER — PHARMACY VISIT (OUTPATIENT)
Dept: PHARMACY | Facility: CLINIC | Age: 49
End: 2024-07-17
Payer: COMMERCIAL

## 2024-07-22 ENCOUNTER — TREATMENT (OUTPATIENT)
Dept: PHYSICAL THERAPY | Facility: CLINIC | Age: 49
End: 2024-07-22
Payer: COMMERCIAL

## 2024-07-22 DIAGNOSIS — M76.822 POSTERIOR TIBIAL TENDINITIS, LEFT: ICD-10-CM

## 2024-07-22 DIAGNOSIS — M67.02 CONTRACTURE OF LEFT ACHILLES TENDON DUE TO NON-NEUROLOGIC CAUSE: ICD-10-CM

## 2024-07-22 DIAGNOSIS — M25.579 CHRONIC ANKLE PAIN, UNSPECIFIED LATERALITY: ICD-10-CM

## 2024-07-22 DIAGNOSIS — G89.29 CHRONIC ANKLE PAIN, UNSPECIFIED LATERALITY: ICD-10-CM

## 2024-07-22 DIAGNOSIS — M76.822 POSTERIOR TIBIAL TENDON DYSFUNCTION, LEFT: ICD-10-CM

## 2024-07-22 DIAGNOSIS — M25.571 RIGHT ANKLE PAIN, UNSPECIFIED CHRONICITY: ICD-10-CM

## 2024-07-22 PROCEDURE — 97110 THERAPEUTIC EXERCISES: CPT | Mod: GP

## 2024-07-22 PROCEDURE — 97112 NEUROMUSCULAR REEDUCATION: CPT | Mod: GP

## 2024-07-22 NOTE — PROGRESS NOTES
"Physical Therapy  Physical Therapy Treatment    Patient Name: Jose Angel Winters  MRN: [id number]  Today's Date: 7/22/2024  Time Calculation  Start Time: 0744  Stop Time: 0839  Time Calculation (min): 55 min    Insurance:  Visit number: 11 of 30  Authorization info: Auth not needed  Insurance Type:  Consumer Select              General  Reason for Referral: L ankle pain  Referred By: Dr. Kimberley Rasmussen MD    Current Problem  1. Right ankle pain, unspecified chronicity  Follow Up In Physical Therapy      2. Chronic ankle pain, unspecified laterality  Follow Up In Physical Therapy      3. Posterior tibial tendon dysfunction, left  Follow Up In Physical Therapy      4. Posterior tibial tendinitis, left  Follow Up In Physical Therapy      5. Contracture of left Achilles tendon due to non-neurologic cause  Follow Up In Physical Therapy                Precautions  Precautions Comment: none         Subjective:   Patient reports doing better bilateral foot and ankle but is having some back pain, feels it is from either personal training + sitting for too long with work. Overall foot and ankle are much more manageable and insetrs have definitely helped. Pain 1/10 L foot.  HEP Performed:  partially limited by back pain    Objective:  Biodex LOS: lvl12= 13% without assist    Treatments:   Upright Bike (seat#1)- 5 min res 7-10  Biodex: lvl12= decreased hand assist= 17% 1:15; no hand assist= 13%  Soleus stretch stairs [id number] ea   Gastroc stretch stairs [id number] ea  Hamstring stretch stairs [id number] ea  Banded hip opener: dbl org [id number] upright and hinged ea  Board isos: PF/DF- x2min ea 20lbs PF harder on L but overall good  Board isos: inv/ev- x2min@10lbsea both harder on L , especially ev  Ale slide latera lunge Hip adductor [id number] ea  Standing HR with tennis ball squeeze between feet 8x2 5\" eccentrics.- not done    Banded adduction with 45o line of force to bias PPT 2x20 GTB 1st set Blue NOT DONE(added to HEP) "     Charges: TE 3, NMR 1    Assessment:   Pt still lacks strengthening and LE strength tolerance need multiple rest breaks with ev and inv isos with compensation and soreness noted in adductors and quad. Continue to focus on adductor and calf mobility and glute and foot strength and stability. Ready for independent Dc with home program.     Plan:   Continue foot ankle and hip strength quad and adductor mobility assess stair ambulation mechanics bare foot.  DC with HEP    Quinn Saldana, PT

## 2024-08-01 ENCOUNTER — APPOINTMENT (OUTPATIENT)
Dept: PRIMARY CARE | Facility: CLINIC | Age: 49
End: 2024-08-01
Payer: COMMERCIAL

## 2024-08-01 VITALS
TEMPERATURE: 97.6 F | SYSTOLIC BLOOD PRESSURE: 128 MMHG | HEART RATE: 73 BPM | DIASTOLIC BLOOD PRESSURE: 83 MMHG | BODY MASS INDEX: 47.74 KG/M2 | HEIGHT: 68 IN | WEIGHT: 315 LBS | OXYGEN SATURATION: 96 %

## 2024-08-01 DIAGNOSIS — F41.9 ANXIETY AND DEPRESSION: ICD-10-CM

## 2024-08-01 DIAGNOSIS — I10 ESSENTIAL HYPERTENSION: Primary | ICD-10-CM

## 2024-08-01 DIAGNOSIS — F32.A ANXIETY AND DEPRESSION: ICD-10-CM

## 2024-08-01 DIAGNOSIS — E66.01 CLASS 3 SEVERE OBESITY DUE TO EXCESS CALORIES WITH SERIOUS COMORBIDITY AND BODY MASS INDEX (BMI) OF 45.0 TO 49.9 IN ADULT (MULTI): ICD-10-CM

## 2024-08-01 DIAGNOSIS — R73.03 PREDIABETES: ICD-10-CM

## 2024-08-01 DIAGNOSIS — M76.822 POSTERIOR TIBIAL TENDON DYSFUNCTION, LEFT: ICD-10-CM

## 2024-08-01 PROCEDURE — 3074F SYST BP LT 130 MM HG: CPT | Performed by: STUDENT IN AN ORGANIZED HEALTH CARE EDUCATION/TRAINING PROGRAM

## 2024-08-01 PROCEDURE — 3079F DIAST BP 80-89 MM HG: CPT | Performed by: STUDENT IN AN ORGANIZED HEALTH CARE EDUCATION/TRAINING PROGRAM

## 2024-08-01 PROCEDURE — 1036F TOBACCO NON-USER: CPT | Performed by: STUDENT IN AN ORGANIZED HEALTH CARE EDUCATION/TRAINING PROGRAM

## 2024-08-01 PROCEDURE — 99214 OFFICE O/P EST MOD 30 MIN: CPT | Performed by: STUDENT IN AN ORGANIZED HEALTH CARE EDUCATION/TRAINING PROGRAM

## 2024-08-01 PROCEDURE — 3008F BODY MASS INDEX DOCD: CPT | Performed by: STUDENT IN AN ORGANIZED HEALTH CARE EDUCATION/TRAINING PROGRAM

## 2024-08-01 ASSESSMENT — PAIN SCALES - GENERAL: PAINLEVEL: 0-NO PAIN

## 2024-08-01 NOTE — PROGRESS NOTES
"Subjective   Patient ID: Jose Angel Winters is a 48 y.o. male who presents for Follow-up.    HPI   Doing well since last in.    Re: HTN - at goal today! See APC notes. His home cuff is certified. Reports no sx high or low from HTN; denies blurry vision, HA, dizziness LoC CP SoB [place] and leg swelling      Re: anxiety - fair control on Lexapro 20mg. Occasional \"stress crises\" but doing OK.      Re:  - Doing great since meatal stenosis surgery.      Re: obesity / PreDM - BMI > 40. A1c stable and A1c < 6 due for repeat A1c. Not interested in starting medication. Contemplative about meaningful lifestyle changes.     Re: ankle pain - see PT and ortho notes. Wearing orthotics and doing the PT.      Re: CLIFTON - on CPAP. Stable.      Re: HM - Cologuard completed. PSA UTD. Shots UTD.      PMHx, FHx, Social Hx, Surg Hx personally reviewed at this appointment. No pertinent findings and/or changes from prior (if applicable).     ROS: Denies wt gain/loss f/c HA LoC CP SOB NVDC. See HPI above, and scanned sheet (if applicable). All other systems are reviewed and are without complaint.     Review of Systems    Objective   /83   Pulse 73   Temp 36.4 °C (97.6 °F)   Ht 1.727 m (5' 8\")   Wt 143 kg (316 lb)   SpO2 96%   BMI 48.05 kg/m²     Physical Exam  Gen: morbidly obese, NAD. AAO x3.  HEENT: NC/AT. Anicteric sclera, symmetric pupils. MMM no thrush.  Neck: Soft, supple. No LAD. No goiter.  CV: RRR nl s1s2 no m/r/g  Pulm: CTAB no w/r/r, good air exchange  GI: obese, soft NTND BS+ no hsm  Ext: WWP no edema  Neuro: II-XII grossly intact, nonfocal systemic findings  MSK: 5/5 strength b/l UE and LE  Gait: unremarkable     Lab Results   Component Value Date    WBC 5.5 03/15/2024    HGB 14.0 03/15/2024    HCT 42.3 03/15/2024     03/15/2024    CHOL 137 03/15/2024    TRIG 122 03/15/2024    HDL 33.5 03/15/2024    ALT 59 (H) 03/15/2024    AST 31 03/15/2024     03/15/2024    K 4.4 03/15/2024     03/15/2024    " CREATININE 0.76 03/15/2024    BUN 10 03/15/2024    CO2 28 03/15/2024    TSH 1.29 08/04/2021    PSA 0.37 03/15/2024    INR 0.9 07/20/2023    HGBA1C 5.9 (H) 03/15/2024     par    XR ankle left 3+ views  Narrative: Interpreted By:  Kailash Bourne,   STUDY:  XR ANKLE LEFT 3+ VIEWS; ;  3/18/2024 3:28 pm      INDICATION:  Signs/Symptoms:ankle pain.      COMPARISON:  None.      ACCESSION NUMBER(S):  [id number]      ORDERING CLINICIAN:  JEREMIE WHITE      FINDINGS:  Chronic appearing ossicle inferior to the medial malleolus. Ankle  mortise appears congruent. Soft tissue swelling. Calcaneal  enthesopathy.      Impression: Chronic appearing ossicle inferior to the medial malleolus suggestive  of remote avulsive injury. Correlate for focal point tenderness in  this region for exclusion of superimposed acute abnormality.          MACRO:  None      Signed by: Kailash Bourne 3/20/2024 1:22 AM  Dictation workstation:   [id number]    Current Outpatient Medications   Medication Instructions    brimonidine-timoloL (Combigan) 0.2-0.5 % ophthalmic solution Administer 1 drop into both eyes in the morning and 1 drop before bedtime.    cetirizine (ALL DAY ALLERGY (CETIRIZINE)) 10 mg, oral, Daily    cholecalciferol (VITAMIN D-3) 50 mcg, oral, Daily    escitalopram (Lexapro) 20 mg tablet TAKE 1 TABLET BY MOUTH ONCE DAILY    latanoprost (Xalatan) 0.005 % ophthalmic solution ADMINISTER 1 DROP INTO BOTH EYES AT BEDTIME.    lisinopril 20 mg, oral, Daily    melatonin (MELATIN) 3 mg, oral, Nightly        Assessment/Plan     # HTN: at/near goal  - continue current regimen  - routine lab work if not recent  - continue lifestyle modifications     # Obesity  - counselled on wt loss via diet, exercise, and other lifestyle modifications     # Depression and/or Anxiety  - continue SSRI      # CLIFTON on CPAP  - continue using CPAP      # PreDM  - A1C q6 mos  - wt loss with diet and exercise; declines GLP1/GIB  - continue ACEi    # meatal stenosis:  improved s/p surgery  - follow up urology PRN     # Health Maintenance  - routine blood work  - Colon Cancer Screening: Teresa UTD (repeat 2026)  - PSA: UTD  - Immunizations: UTD  - AAA screening: Not indicated     Problem List Items Addressed This Visit             ICD-10-CM    Anxiety and depression F41.9, F32.A    Relevant Orders    Comprehensive Metabolic Panel    Essential hypertension - Primary I10    Relevant Orders    Comprehensive Metabolic Panel    Class 3 severe obesity due to excess calories with serious comorbidity and body mass index (BMI) of 45.0 to 49.9 in adult (Multi) E66.01, Z68.42    Prediabetes R73.03    Relevant Orders    Comprehensive Metabolic Panel    Posterior tibial tendon dysfunction, left M76.822

## 2024-08-01 NOTE — PATIENT INSTRUCTIONS
Please stop at the lab ([address]) to complete your blood and/or urine work that I've ordered for you.    I will contact you with the results at my soonest convenience. I strongly urge you to use [employer] as this is the quickest and easiest way to access your results and receive my correspondences.    Your medications are up to date today, I will renew them when a refill is due.     Follow up with your specialists as previously scheduled.     Great work with your blood pressure!    See me in 6 months for a physical exam.

## 2024-08-14 ENCOUNTER — TREATMENT (OUTPATIENT)
Dept: PHYSICAL THERAPY | Facility: CLINIC | Age: 49
End: 2024-08-14
Payer: COMMERCIAL

## 2024-08-14 DIAGNOSIS — M76.822 POSTERIOR TIBIAL TENDINITIS, LEFT: ICD-10-CM

## 2024-08-14 DIAGNOSIS — M67.02 CONTRACTURE OF LEFT ACHILLES TENDON DUE TO NON-NEUROLOGIC CAUSE: ICD-10-CM

## 2024-08-14 DIAGNOSIS — M25.579 CHRONIC ANKLE PAIN, UNSPECIFIED LATERALITY: ICD-10-CM

## 2024-08-14 DIAGNOSIS — M76.822 POSTERIOR TIBIAL TENDON DYSFUNCTION, LEFT: ICD-10-CM

## 2024-08-14 DIAGNOSIS — M25.571 RIGHT ANKLE PAIN, UNSPECIFIED CHRONICITY: ICD-10-CM

## 2024-08-14 DIAGNOSIS — G89.29 CHRONIC ANKLE PAIN, UNSPECIFIED LATERALITY: ICD-10-CM

## 2024-08-14 PROCEDURE — 97112 NEUROMUSCULAR REEDUCATION: CPT | Mod: GP

## 2024-08-14 PROCEDURE — 97110 THERAPEUTIC EXERCISES: CPT | Mod: GP

## 2024-08-14 NOTE — PROGRESS NOTES
Physical Therapy  Physical Therapy Treatment    Patient Name: Jose Angel Winters  MRN: [id number]  Today's Date: 8/14/2024  Time Calculation  Start Time: 0916  Stop Time: 0949  Time Calculation (min): 33 min    Insurance:  Visit number: 12 of 30  Authorization info: Auth not needed  Insurance Type:  Consumer [employer]              General       Current Problem  1. Right ankle pain, unspecified chronicity  Follow Up In Physical Therapy      2. Chronic ankle pain, unspecified laterality  Follow Up In Physical Therapy      3. Posterior tibial tendon dysfunction, left  Follow Up In Physical Therapy      4. Posterior tibial tendinitis, left  Follow Up In Physical Therapy      5. Contracture of left Achilles tendon due to non-neurologic cause  Follow Up In Physical Therapy                          Subjective:   Patient reports continuing to do better not having any pain coming in today.  HEP Performed:  partially    Objective:  Biodex LOS: lvl12= 16% without assist    Treatments:     Biodex: lvl12=  no hand assist= 16%  Lvl10: 12%  Toe abduction sling to lung ex10 ea    Access Code: [email or web address]  URL: [email or web address]  Date: 08/14/2024  Prepared by: Quinn Saldana    Exercises- all performed 1 set ea.  - Long Sitting Calf Stretch with Strap  - 1 x daily - 7 x weekly - 1-2 min hold  - Towel Scrunches  - 1 x daily - 7 x weekly - 2 sets - 10 reps - 3 sec hold  - Seated Arch Lifts  - 1 x daily - 7 x weekly - 2 sets - 10 reps - 3 sec hold  - Seated Ankle Alphabet  - 1 x daily - 7 x weekly - 3 sets - 10 reps  - Seated Calf Raise with Weights on Thighs On Step  - 1 x daily - 7 x weekly - 3 sets - 15 reps  - Side Stepping with Resistance at Feet  - 1 x daily - 7 x weekly - 3 sets - 10 reps  - Seated Hip Abduction with Resistance  - 1 x daily - 7 x weekly - 3 sets - 10 reps - 5 hold  - Standing Calf Raise With Small Ball at Heels  - 1 x daily - 7 x weekly - 3 sets - 15-20 reps    Banded adduction with 45o  line of force to bias PPT 2x20 GTB 1st set Blue NOT DONE(added to HEP)     Charges: TE 1, NMR 1    Assessment:   Pt handled progression of exercise well and reported understanding of home program, is ready for DC.    Plan:       Physical Therapy  Discharge Summary    Referral/Discharge Info:  Date of Discharge: 8/14/24  Date of Last Visit: 8/14/24  Date of Evaluation: 4/26/24  Number of Visits Attended: 12  Referred by: Kimberley Rasmussen MD   Referred for: R ankle pain and posterior tibial tendinitis    Problems/Issues Addressed:  Ankle, foot, hip weakness and poor balance      Status at Discharge:  Achieved short term goals , plateaued for long term goals, continue to maintain with HEP.    Reason for Discharge:  STG achieved but lTG plateued progress, adequate for DC         Quinn Saldana, PT

## 2024-08-18 PROCEDURE — RXMED WILLOW AMBULATORY MEDICATION CHARGE

## 2024-08-21 ENCOUNTER — APPOINTMENT (OUTPATIENT)
Dept: SLEEP MEDICINE | Facility: CLINIC | Age: 49
End: 2024-08-21
Payer: COMMERCIAL

## 2024-08-21 VITALS
DIASTOLIC BLOOD PRESSURE: 76 MMHG | SYSTOLIC BLOOD PRESSURE: 126 MMHG | TEMPERATURE: 96.5 F | HEIGHT: 68 IN | WEIGHT: 315 LBS | HEART RATE: 65 BPM | OXYGEN SATURATION: 98 % | BODY MASS INDEX: 47.74 KG/M2

## 2024-08-21 DIAGNOSIS — G47.00 INSOMNIA, UNSPECIFIED TYPE: ICD-10-CM

## 2024-08-21 DIAGNOSIS — G47.33 OBSTRUCTIVE SLEEP APNEA: Primary | ICD-10-CM

## 2024-08-21 PROCEDURE — 3074F SYST BP LT 130 MM HG: CPT | Performed by: NURSE PRACTITIONER

## 2024-08-21 PROCEDURE — 1036F TOBACCO NON-USER: CPT | Performed by: NURSE PRACTITIONER

## 2024-08-21 PROCEDURE — 3078F DIAST BP <80 MM HG: CPT | Performed by: NURSE PRACTITIONER

## 2024-08-21 PROCEDURE — G2211 COMPLEX E/M VISIT ADD ON: HCPCS | Performed by: NURSE PRACTITIONER

## 2024-08-21 PROCEDURE — 3008F BODY MASS INDEX DOCD: CPT | Performed by: NURSE PRACTITIONER

## 2024-08-21 PROCEDURE — 99214 OFFICE O/P EST MOD 30 MIN: CPT | Performed by: NURSE PRACTITIONER

## 2024-08-21 RX ORDER — FLUTICASONE FUROATE 27.5 UG/1
2 SPRAY, METERED NASAL AS NEEDED
COMMUNITY

## 2024-08-21 ASSESSMENT — SLEEP AND FATIGUE QUESTIONNAIRES
DIFFICULTY_STAYING_ASLEEP: MODERATE
HOW LIKELY ARE YOU TO NOD OFF OR FALL ASLEEP WHILE LYING DOWN TO REST IN THE AFTERNOON WHEN CIRCUMSTANCES PERMIT: WOULD NEVER DOZE
SLEEP_PROBLEM_INTERFERES_DAILY_ACTIVITIES: SOMEWHAT
DIFFICULTY_FALLING_ASLEEP: MODERATE
SITING INACTIVE IN A PUBLIC PLACE LIKE A CLASS ROOM OR A MOVIE THEATER: WOULD NEVER DOZE
SATISFACTION_WITH_CURRENT_SLEEP_PATTERN: SATISFIED
HOW LIKELY ARE YOU TO NOD OFF OR FALL ASLEEP WHILE WATCHING TV: WOULD NEVER DOZE
HOW LIKELY ARE YOU TO NOD OFF OR FALL ASLEEP WHILE SITTING QUIETLY AFTER LUNCH WITHOUT ALCOHOL: WOULD NEVER DOZE
ESS-CHAD TOTAL SCORE: 0
HOW LIKELY ARE YOU TO NOD OFF OR FALL ASLEEP IN A CAR, WHILE STOPPED FOR A FEW MINUTES IN TRAFFIC: WOULD NEVER DOZE
HOW LIKELY ARE YOU TO NOD OFF OR FALL ASLEEP WHILE SITTING AND TALKING TO SOMEONE: WOULD NEVER DOZE
SLEEP_PROBLEM_NOTICEABLE_TO_OTHERS: MUCH
WAKING_TOO_EARLY: MILD
HOW LIKELY ARE YOU TO NOD OFF OR FALL ASLEEP WHEN YOU ARE A PASSENGER IN A CAR FOR AN HOUR WITHOUT A BREAK: WOULD NEVER DOZE
WORRIED_DISTRESSED_DUE_TO_SLEEP: SOMEWHAT
HOW LIKELY ARE YOU TO NOD OFF OR FALL ASLEEP WHILE SITTING AND READING: WOULD NEVER DOZE

## 2024-08-21 NOTE — PATIENT INSTRUCTIONS
CPAP 10-14 EPR of 2  Lexapro at bedtime  Can increase melatonin at 6 mg   Yoga enrique/ hannah     [facility] Sleep Medicine  DO [address]  [facility]  [address] [facility] [address]       NAME: Jose Angel Winters   DATE:  08/21/24    DIAGNOSIS:   1. Obstructive sleep apnea  Positive Airway Pressure (PAP) Therapy    CANCELED: Positive Airway Pressure (PAP) Therapy      2. Insomnia, unspecified type            Thank you for coming to the Sleep Medicine Clinic today! Your sleep medicine provider today was: Leah Agosto, AURE-CNP Below is a summary of your treatment plan, other important information, and our contact numbers:    TREATMENT PLAN:   - Follow-up in 12 months.  - If not already done, sign up for 'My Chart' and send prescription requests or messages through this      Annual Reminders About Your Sleep Apnea    Your sleep apnea is well controlled based on reviewing your PAP Data Report.     General Reminders:  Continue current machine settings. Continue using machine every night. Need at least 4 hours daily usage.   Remember to clean your mask, tubings, and water chamber regularly as instructed.  Know the replacement schedule of your supplies/ accessories and contact your DME (durable medical equipment) provider if you are due for them.  Avoid driving or operating heavy machinery when drowsy. A person driving while sleepy is 5 times more likely to have an accident. If you feel sleepy, pull over and take a short power nap (sleep for less than 30 minutes). Otherwise, ask somebody to drive you.    Follow-up sooner through MyChart or calling our office if you have any of the following symptoms:  Snoring or stopping breathing while using the machine  Recurrence of fatigue, sleepiness, insomnia, and other symptoms you had prior using machine  Persistent or worsening fatigue or sleepiness despite regular use of machine  Issues tolerating the machine like bloating sensation,  air hunger, too much hot air, too much pressure, taking off mask without recall in the middle of sleep, etc.     Other conservative measures to improve sleep apnea:  Losing weight can lead to some improvement of CLIFTON which means you will need lower pressures in machine to control your CLIFTON. In some patients, they don't need to use the machine after bariatric surgery. Hence, consider medical and/or surgical weight loss especially if your BMI is more than 35.  Avoiding alcohol, sedative-hypnotics, and sedating medications is imperative as these substances can worsen snoring and sleep apnea  If you have nasal congestion or seasonal allergies, improving your breathing through the nose is critical for treating sleep apnea, tolerating CPAP, and improving your sleep; hence, using intranasal steroid spray like Flonase might help. Make sure you know the proper way to use it.  Stay off your back when sleeping.    Common issues with PAP machine:  Mask gets dislodged when turning to the side: Consider getting a CPAP pillow or switching to a mask with hose on top.     Dry mouth:  Your machine has built-in humidifier that heats up the air to prevent dry mouth. It can be adjusted to your comfort. You can try that first and increase setting one level one night at a time to check which setting is comfortable and effective in lessening dry mouth. If dry mouth persists despite humidity setting adjustment, may apply OTC Biotene gel over the gums at bedtime.  If Biotene gel is not effective, consider trying XEROSTOM gel from [employer].  Also, eliminate or reduce dose of meds that can cause dry mouth if possible. Lastly, may try getting a separate room humidifier machine.    Airleaks: Please call DME as they may need to adjust your mask or refit you with a different kind of mask. In addition, you can ask DME for tips on getting a good mask seal and mask fit.     Difficulty tolerating the mask: Contact your DME to try a different kind of  "mask and/or call office to get a referral to Sleep Psychologist for CPAP desensitization. CPAP desensitization technique is a set of strategies that helps patient cope with claustrophobia and anxiety related to wearing mask. Alternatively, we can do a daytime mini-sleep study called PAP-nap trial wherein you will try on different kinds of mask and the sleep technician will try different pressure settings on CPAP and BPAP machines to see which specific pressure is tolerable and comfortable for you.     Water droplets or moisture within the hose and/or mask: This is called rain-out and it is caused by condensation of too much heated humidity on the cooler walls of the hose. If you have rain-out, turn down humidity settings or get a heated hose. If you already have a heated hose, turn up the \"tube temperature\" of the heated hose. Alternatively, if you don't want to get a heated hose or warmer air, may wrap the CPAP hose with stockings to keep it somewhat warm. Also, you need to place the machine on the floor and lower the hose so that water won't travel upward towards your mask.     You can also go to the following EDUCATION WEBSITES for further information:   American Academy of Sleep Medicine http://sleepeducation.org  National Sleep Foundation: https://sleepfoundation.org  American Sleep Apnea Association: https://www.sleepapnea.org (for patients with sleep apnea)    Here at [facility], we wish you a restful sleep!     Instructions - Common CLIFTON Recs: - For your sleep apnea, continue to use your PAP every night and use it whenever you are sleeping.   - Avoid alcohol or sedatives several hours prior to sleeping.   - Get additional supplies for your PAP (e.g., mask, hose, filters) every 3 months or as your insurance allows from your [employer] company. Replacement cushions for your PAP mask can be requested monthly if airseals are an issue.  - Remember to clean your mask, tubings, and water chamber regularly as " instructed.  - Avoid driving or operating heavy machinery when drowsy. A person driving while sleepy is five (5) times more likely to have an accident. If you feel sleepy, pull over and take a short power nap (sleep for less than 30 minutes). Otherwise, ask somebody to drive you.    EASY WAYS TO IMPROVE YOUR SLEEP:  1. Go to bed and wake up at the same time every day.   Aim for 8 hours but some people need less, some need more.   Get out of bed if you are not sleeping.   Limit naps to 20 min or less.   2. Expose yourself to daylight and/ or bright light in the morning.   Go outside or spend time near a window each morning.   You can use a light box (found on Amazon) if you wake before the sunrise.   Limit light exposure in the evenings (including electronic usage).   Try meditation, reading, stretching, deep breathing, warm shower or bath, or yoga nidra as part of your bedtime routine. There are many great FREE, videos or audio tracks on [employer], etc for guidance.  3. Exercise, in some form, EVERY day, but not too close to bedtime. Consider making this part of your routine at the start of your day, followed by a cool shower.  4. Eat meals at roughly the same time every day. Make sure you are prioritizing fruits, vegetables, whole grains, lean proteins.  5. Time your caffeine intake. Make sure you are not drinking caffeine within 8 to 12 hours prior to your bedtime.   6. Avoid marijuana, alcohol, and nicotine. They will reduce sleep quality in any quantity.  7. Learn to manage anxiety. Psychology services at  can be reached at [phone] to schedule an appointment.     IMPORTANT INFORMATION:     Call 911 for medical emergencies.  Our offices are generally open from Monday-Friday, 9 am - 5 pm.  If you need to get in touch with me, you may either call me and my team(number is below) or you can use [employer].  If a referral for a test, for CPAP, or for another specialist was made, and you have not heard about  scheduling this within a week, please call scheduling at [phone] ([phone]).  If you are unable to make your appointment for clinic or an overnight study, kindly call the office at least 48 hours in advance to cancel and reschedule.  If you are on CPAP, please bring your device's card to each clinic appointment unless told otherwise by your provider.  There are no supporting services by either the sleep doctors or their staff on weekends and Holidays, or after 5 PM on weekdays.   If you have been asked to come to a sleep study, make sure you bring toiletries, a comfy pillow, and any nighttime medications that you may regularly take. Also be sure to eat dinner before you arrive. We generally do not provide meals.      PRESCRIPTIONS:  We require 7 days advanced notice for prescription refills. If we do not receive the request in this time, we cannot guarantee that your medication will be refilled in time. Please contact the sleep nurses listed below for refills or request via [facility].     IMPORTANT PHONE NUMBERS:   Sleep Medicine Clinic Fax: [phone]  Appointments (for Pediatric Sleep Clinic): [phone] - option 1  Appointments (for Adult Sleep Clinic): [phone] - option 2  Appointments (For Sleep Studies): [phone] - option 3  Behavioral Sleep Medicine: [phone]  Sleep Surgery: [phone]  ENT (Otolaryngology): [phone]  Headache Clinic (Neurology): [phone]  Neurology: [phone]  Psychiatry: [phone]  Pulmonary Function Testing (PFT) Center: [phone]  Pulmonary Medicine: [phone]  [employer] (DME): [phone]  [employer] (DME): [phone]  [facility] [phone]    Our Adult Sleep Medicine Team (Please do not hesitate to call the office or sleep nurse with any questions between appointments):    Adult Sleep Nurses (Nancy Hernandez RN and Sherice Luna RN):  For clinical questions and refilling prescriptions:  [phone]  Email sleep diaries and other documents at: [email or web address]    Adult Sleep Medicine Secretaries:  Maricel Juarez (For Nataliya/Desai/Krise/Strohl/Yeh): P: [phone]  F: [phone]  Darian Ann ([place])Office: [phone] option 4 Office Fax: [phone]  Sheree Toribio (For [place]): P: [phone]  Fax: [phone]  Stacia Hasseth (For Jurcevic/Blank): P: [phone]  F: [phone]  Merly Booth (For Yevgeniy): P: [phone]  F: [phone]  Tiffanie Kennedy (For Carmen/Vera/Zakhary): P: [phone]  F: [phone]  Nelda Guzmán (For Seamus/Hays): P: [phone]  F: [phone]     Adult Sleep Medicine Advanced Practice Providers:  Ayaan Estrada ([place], [place])  Audrey Gamez ([facility])  Leah Agosto CNP ([place], [place], [facility])  Luanne Hilliard CNP ([place], [place], [facility])  Vida Vuong ([facility])  Luis E Hays CNP ([facility])      Our Sleep Testing Center (STC) Locations:  Our team will contact you to schedule your sleep study, however, you can contact us as follow:  Main Phone Line (scheduling only): [phone] ([phone]), option 3  Adult and Pediatric Locations  [facility] (6 years and older): [facility] - 4th floor ([address] [place] [address]) After hours line: [phone]  [facility] (Main campus: All ages): [facility], 6th floor. After hours line: [phone]  [facility] (5 years and older; younger considered on case-by-case basis): [address]; [address], [address]. Scheduling  After hours line: [phone]   [place] (6 years and older): [address]; [address]; [address]   [place] (6 years and older): [address] [facility] [address]  After hours line: [phone]   [place] (13 years and older) in [place]: [address], 2nd floor  After hours line: [phone]  [facility]  "(13 year and older): [address]  After hours line: [phone] (Home studies out of [facility])    Adult Only Locations:   [place] (18 years and older): [address] [place] [address], 2nd floor   [place] (18 years and older): [address] [facility]; 4th floor  After hours line: [phone]   [place] (18 years and older) at [place]: [id number] [place] [address]  After hours line: [phone]        CONTACTING YOUR SLEEP MEDICINE PROVIDER:  Send a message directly to your provider through \"My Chart\", which is the email service through your  Records Account: [email or web address]   Call [phone] and leave a message. One of the administrative assistants will forward the message to your sleep medicine provider through \"My Chart\" and/or email.     Your sleep medicine provider for this visit was: Leah Agosto, APRN-CNP    In the event that you are running more than 15 minutes late to your appointment, I will kindly ask you to reschedule.       "

## 2024-08-21 NOTE — ASSESSMENT & PLAN NOTE
sleep study in the past completed on 3/16/2019 at  Sleep lab showing severe sleep apnea with an AHI of 108.6 and SpO2 kennedy of 75%  CPAP was titrated from 4 cwp to 19/15 BiPAP. Treatment emergent centrals were noted throughout titration. Recommendation is for dedicated PAP titration study.     -Well controlled on current settings. Will trial adjustment to 10-14 Epr of 2 for residual awakenings. Jose Angel is agreeable.   -Supply order updated today  -RTC 12 mo or sooner if needed

## 2024-08-21 NOTE — PROGRESS NOTES
Patient: Raciel Winters    [id number]  : 1975 -- AGE 49 y.o.    Provider: MIKE Medina     Location [facility]   Service Date: 2024              [facility]  Followup Visit Note    HISTORY OF PRESENT ILLNESS       HISTORY OF PRESENT ILLNESS   Raciel Winters is a 49 y.o. male with past medical history of anxiety, hypertension, insomnia. obesity who presents to a [facility] for followup. RACIEL is a  at [employer].     PAST SLEEP HISTORY  RACIEL has had a sleep study in the past completed on 3/16/2019 at  Sleep lab showing severe sleep apnea with an AHI of 108.6 and SpO2 kennedy of 75%. CPAP was titrated from 4 cwp to 19/15 BiPAP. Treatment emergent centrals were noted throughout titration. Recommendation is for dedicated PAP titration study.     CURRENT SLEEP HISTORY    On today's visit, the patient reports tolerating CPAP well. Relatively comfortable with his mask and air pressure. Notes he wakes in the night at times and has difficulty getting back to sleep. Endorses anxiety as main factor in this. He has been working with his counselor. Notes he typically scrolls his phone which he is aware he should not be doing. Trying to find an activity that may not be as stimulating.     RLS Followup:  denies     Insomnia follow up:  Bedtime: 11 pm  Sleep Latency: 30 minutes  Awakenings: sometimes awake around 3 AM and cannot fall back to sleep, taking melatonin at bedtime. Notes anxiety typically keeps him awake  Wake time: 6:15 AM week days; 7 on weekends   Naps:   none     ESS: 0   JOSH: 14  FOSQ:33    REVIEW OF SYSTEMS     REVIEW OF SYSTEMS  Review of Systems   All other systems reviewed and are negative.    ALLERGIES AND MEDICATIONS     ALLERGIES  Allergies   Allergen Reactions    Pseudoephedrine Hcl Other       MEDICATIONS  Current Outpatient Medications   Medication Sig Dispense Refill     brimonidine-timoloL (Combigan) 0.2-0.5 % ophthalmic solution Administer 1 drop into both eyes in the morning and 1 drop before bedtime. 5 mL 3    cetirizine (All Day Allergy, cetirizine,) 10 mg tablet Take 1 tablet (10 mg) by mouth once daily.      escitalopram (Lexapro) 20 mg tablet TAKE 1 TABLET BY MOUTH ONCE DAILY 90 tablet 3    latanoprost (Xalatan) 0.005 % ophthalmic solution ADMINISTER 1 DROP INTO BOTH EYES AT BEDTIME. 2.5 mL 11    lisinopril 20 mg tablet Take 1 tablet (20 mg) by mouth once daily. 90 tablet 3    melatonin (Melatin) 3 mg tablet Take 1 tablet (3 mg) by mouth once daily at bedtime.      cholecalciferol (Vitamin D-3) 50 MCG (2000 UT) tablet Take 1 tablet (50 mcg) by mouth once daily.      fluticasone (Flonase Sensimist) 27.5 mcg/actuation nasal spray Administer 2 sprays into each nostril if needed for rhinitis.       No current facility-administered medications for this visit.       PPAST MEDICAL HISTORY  Past Medical History:   Diagnosis Date    Allergic     Anxiety     Bilateral wrist pain 08/07/2023    Cubital tunnel syndrome, right 08/07/2023    Depression, unspecified 11/24/2013    Depression    Hypertension     Metabolic syndrome 11/24/2013    Dysmetabolic syndrome X    Obesity, unspecified 11/24/2013    Obesity    Other conditions influencing health status 11/24/2013    Spastic Flat Foot    Pain in unspecified foot 06/03/2014    Foot pain    Primary open angle glaucoma (POAG) of both eyes, mild stage 11/17/2023    Urethral meatal stenosis 08/07/2023       PAST SURGICAL HISTORY:  Past Surgical History:   Procedure Laterality Date    OTHER SURGICAL HISTORY  06/11/2018    Oral Surgery Tooth Extraction [place] Tooth    OTHER SURGICAL HISTORY  06/11/2018    Inguinal Hernia Repair For Child 6 Mo. To 5 Years Old    WISDOM TOOTH EXTRACTION         FAMILY HISTORY  Family History   Problem Relation Name Age of Onset    Hypertension Father K     Diabetes Maternal Grandmother Grammie     Stroke Paternal  "Grandfather GW     Hearing loss Paternal Grandmother Grandma     Stroke Paternal Grandmother Grandma     Cancer Mother's Brother Uncle H     Hernia Brother R     Learning disabilities Brother R        FAMILY HISTORY: No changes since previous visit. Otherwise non-contributory as charted.       SOCIAL HISTORY  He  reports that he has never smoked. He has never used smokeless tobacco. He reports current alcohol use of about 2.0 standard drinks of alcohol per week. He reports that he does not use drugs.       PHYSICAL EXAM     VITAL SIGNS: /76 (BP Location: Right arm, Patient Position: Sitting, BP Cuff Size: Adult)   Pulse 65   Temp 35.8 °C (96.5 °F) (Temporal)   Ht 1.727 m (5' 8\")   Wt 147 kg (324 lb)   SpO2 98%   BMI 49.26 kg/m²      PREVIOUS WEIGHTS:  Wt Readings from Last 3 Encounters:   08/21/24 147 kg (324 lb)   08/01/24 143 kg (316 lb)   03/18/24 142 kg (312 lb)       Physical Exam  Physical Exam   Constitutional: Alert and oriented, cooperative, no obvious distress   HEENT: Non icteric or anemic, EOM WNL bilaterally   Neck: Supple, no JVD, no goiter, no adenopathy, no rigidity  Chest: CTA bilaterally, no wheezing, crackles, rubs   Cardiac: RRR, S1 and S2, no murmur, rub, thrill   Abdomen: Obese, Soft, nontender, no masses, no organomegaly   Extremities: No clubbing, no LL edema   Neuromuscular: Cranial nerves grossly intact, no focal deficits      RESULTS/DATA     Bicarbonate   Date Value   03/15/2024 28 mmol/L   07/20/2023 26 MMOL/L   04/10/2023 28 mmol/L   07/25/2022 28 mmol/L       No results found for this or any previous visit from the past 365 days.       PAP Adherence:    Airsense 10 set up 10/28/19  Mask: P30i nasal pillow   Next supplies: 9/25/24      ASSESSMENT/PLAN     Mr. Winters is a 49 y.o. male and He returns in followup to the [facility] for CLIFTON.    Problem List and Orders  Problem List Items Addressed This Visit             ICD-10-CM    Insomnia G47.00 "     Likely multifactorial including sleep apnea, anxiety, poor sleep hygiene.  Discussed recommendations to avoid electronic use prior to bed. Brainstormed activities to engage in instead. Jose Angel verbalized understanding. Consider CBT-I referral.   May trial increase in melatonin to 6 mg  Recommended lexapro at bedtime          Obstructive sleep apnea - Primary G47.33     sleep study in the past completed on 3/16/2019 at  Sleep lab showing severe sleep apnea with an AHI of 108.6 and SpO2 kennedy of 75%  CPAP was titrated from 4 cwp to 19/15 BiPAP. Treatment emergent centrals were noted throughout titration. Recommendation is for dedicated PAP titration study.     -Well controlled on current settings. Will trial adjustment to 10-14 Epr of 2 for residual awakenings. Jose Angel is agreeable.   -Supply order updated today  -RTC 12 mo or sooner if needed            Relevant Orders    Positive Airway Pressure (PAP) Therapy       Disposition    Return to clinic in 12 months

## 2024-08-21 NOTE — ASSESSMENT & PLAN NOTE
Likely multifactorial including sleep apnea, anxiety, poor sleep hygiene.  Discussed recommendations to avoid electronic use prior to bed. Brainstormed activities to engage in instead. Jose Angel verbalized understanding. Consider CBT-I referral.   May trial increase in melatonin to 6 mg  Recommended lexapro at bedtime

## 2024-08-22 ENCOUNTER — PHARMACY VISIT (OUTPATIENT)
Dept: PHARMACY | Facility: CLINIC | Age: 49
End: 2024-08-22
Payer: COMMERCIAL

## 2024-08-29 NOTE — PROGRESS NOTES
Scribed for Dr. Jesus Carter by Emil Beckman. I, Dr. Jesus Carter have personally reviewed and agreed with the information entered by the Virtual Scribe. 08/30/24.    ASSESSMENT:  Problem List Items Addressed This Visit    None  Visit Diagnoses       Stricture of male urethra, unspecified stricture type    -  Primary    Relevant Orders    Measure post void residual (Completed)          1. Meatal stenosis  2. Irritable voiding symptoms  3. Anxiety/depression - severe anxiety towards undergoing medical procedures.  4. CLIFTON - on CPAP  5. Morbid obesity  6. HTN    PLAN:  #Meatal stenosis  s/p meatal urethroplasty with me. (08/04/23)  Now completely healed, without any recurrence of his obstructive symptoms.  Remains satisfied with the results of the procedure.   Encouraged to call if he develops any acute or worsening symptoms.  RTC in 1 year for reassessment and flow/pvr.     All questions were answered to the patient’s satisfaction.  Patient agrees with the plan and wishes to proceed.  Continue follow-up for ongoing care of his chronic medical conditions.       History of Present Illness (HPI):  Jose Angel presents for a follow up visit.  The patient’s EMR has been reviewed.  Lives in [place], [place]  Occupation:     Referred by Dr. Guzman for meatal stenosis.   s/p meatal urethroplasty with me. (08/04/23)  Cysto revealed no other strictures or abnormalities.  26 Fr opening created. He was left with no catheter.    TODAY: (08/30/24)  Presents for annual follow up and flow/pvr.   Reports he has been doing well overall.   Remains satisfied with results of urethroplasty.   Denies any recurrence of his obstructive symptoms.  No recent infections, gross hematuria, fevers or chills.   No acute or worsening complaints.     Uroflow results:   Slightly blunted curve  Qmax: 10 mL/s  VV: 74 mL (sub-optimal)  PVR: 3 mL    TO REVIEW: Last visit (11/27/23)  Doing well overall.   No acute or worsening complaints  today.  Stream has remained good, occasional splitting.   Remains satisfied with the results of the procedure.   Denies any worsening LUTS.  Actually since discontinuing coffee his LUTS have improved.   No recent UTI's, dysuria, gross hematuria, fevers, chills or rash.     TO REVIEW: (08/28/23)  Reports he is doing well overall, post-operatively.  Experienced mild discomfort initially post-op.  Well-controlled with OTC Tylenol PRN.  Reports his stream is now improved.  Continues to spray while voiding, but this is not bothersome.  Reports persistent sensation of incomplete emptying occasionally.  No recent UTIs, gross hematuria, fevers or chills.     Uroflow results:  Normal bell-curve  Peak 23 mL/s   mL  PVR 71 mL.     TO REVIEW: Initial visit (06/30/23)  Hx of prostatitis, morbid obesity, HTN, depression, anxiety, CLIFTON (on CPAP).  Reports developing severe anxiety towards medical procedures.   Has required sedation in the past.      C/O gradually worsening frequency, urgency, and straining to void.  Occasional sensation of incomplete bladder emptying.   Ongoing for the last couple years.   however became bothersome about 10 months ago.  Previously evaluated by Dr. Guzman whom prescribed a topical steroid   He denies any significant improvement with this, thus opted to discontinue.   Last used about 1.5 weeks ago.   Denies any recent UTIs, gross hematuria, flank pain, fevers or chills.     Uroflow results: (05/24/23)  Qmax: 17 mL/s  VV: 380 mL  PVR: 71 mL     PMH: prostatitis remotely (2-3x), presumed 2/2 UTI; successfully treated with ciprofloxacin.  PSH: inguinal hernia repair, sebaceous cyst removal of the cheek (oral).  FH: Denies FHx of  malignancy.  SH: Non-smoker.    Past Medical History:   Diagnosis Date    Allergic     Anxiety     Bilateral wrist pain 08/07/2023    Cubital tunnel syndrome, right 08/07/2023    Depression, unspecified 11/24/2013    Depression    Hypertension     Metabolic syndrome  11/24/2013    Dysmetabolic syndrome X    Obesity, unspecified 11/24/2013    Obesity    Other conditions influencing health status 11/24/2013    Spastic Flat Foot    Pain in unspecified foot 06/03/2014    Foot pain    Primary open angle glaucoma (POAG) of both eyes, mild stage 11/17/2023    Urethral meatal stenosis 08/07/2023     Past Surgical History:   Procedure Laterality Date    OTHER SURGICAL HISTORY  06/11/2018    Oral Surgery Tooth Extraction [place] [facility]    OTHER SURGICAL HISTORY  06/11/2018    Inguinal Hernia Repair For Child 6 Mo. To 5 Years Old    WISDOM TOOTH EXTRACTION       Family History   Problem Relation Name Age of Onset    Hypertension Father K     Diabetes Maternal Grandmother Grammie     Stroke Paternal Grandfather GW     Hearing loss Paternal Grandmother Grandma     Stroke Paternal Grandmother Grandma     Cancer Mother's Brother Uncle H     Hernia Brother R     Learning disabilities Brother R      Social History     Tobacco Use   Smoking Status Never   Smokeless Tobacco Never     Current Outpatient Medications   Medication Sig Dispense Refill    brimonidine-timoloL (Combigan) 0.2-0.5 % ophthalmic solution Administer 1 drop into both eyes in the morning and 1 drop before bedtime. 5 mL 3    cetirizine (All Day Allergy, cetirizine,) 10 mg tablet Take 1 tablet (10 mg) by mouth once daily.      cholecalciferol (Vitamin D-3) 50 MCG (2000 UT) tablet Take 1 tablet (50 mcg) by mouth once daily.      escitalopram (Lexapro) 20 mg tablet TAKE 1 TABLET BY MOUTH ONCE DAILY 90 tablet 3    fluticasone (Flonase Sensimist) 27.5 mcg/actuation nasal spray Administer 2 sprays into each nostril if needed for rhinitis.      latanoprost (Xalatan) 0.005 % ophthalmic solution ADMINISTER 1 DROP INTO BOTH EYES AT BEDTIME. 2.5 mL 11    lisinopril 20 mg tablet Take 1 tablet (20 mg) by mouth once daily. 90 tablet 3    melatonin (Melatin) 3 mg tablet Take 1 tablet (3 mg) by mouth once daily at bedtime.       No current  facility-administered medications for this visit.     Allergies   Allergen Reactions    Pseudoephedrine Hcl Other     Past medical, surgical, family and social history in the chart was reviewed and is accurate including any additions to what is in this HPI.    REVIEW OF SYSTEMS (ROS):   Constitutional: denies any unintentional weight loss or change in strength.  Integumentary: denies any rashes or pruritus.  Eyes: denies any double vision or eye pain.  Ear/Nose/Mouth/Throat: denies any nosebleeds or gum bleeds.  Cardiovascular: denies any chest pain or syncope.  Respiratory: denies hemoptysis.  Gastrointestinal: denies nausea or vomiting.  Musculoskeletal: denies muscle cramping or weakness.  Neurologic: denies convulsions or seizures.  Hematologic/Lymphatic: denies bleeding tendencies.  Endocrine: denies heat/cold intolerance.  All other systems have been reviewed and are negative unless otherwise noted in the HPI.     OBJECTIVE:  There were no vitals taken for this visit.  PHYSICAL EXAM:  Constitutional: No obvious distress.  Eyes: Non-injected conjunctiva, sclera clear, EOMI.  Ears/Nose/Mouth/Throat: No obvious drainage per ears or nose.  Cardiovascular: Extremities are warm and well perfused. No edema, cyanosis or pallor.  Respiratory: No audible wheezing/stridor; respirations do not appear labored.  Gastrointestinal: Abdomen soft, not distended.  Musculoskeletal: Normal ROM of extremities.  Skin: No obvious rashes or open sores.  Neurologic: Alert and oriented, CN 2-12 grossly intact.  Psychiatric: Answers questions appropriately with normal affect.  Hematologic/Lymphatic/Immunologic: No obvious bruises or sites of spontaneous bleeding.  Genitourinary: No CVA tenderness, bladder not palpable.   Patent meatus; no signs of rash or lesion including LS.     LABS & IMAGING:  Lab Results   Component Value Date    WBC 5.5 03/15/2024    HGB 14.0 03/15/2024    HCT 42.3 03/15/2024     03/15/2024    CHOL 137  03/15/2024    TRIG 122 03/15/2024    HDL 33.5 03/15/2024    ALT 59 (H) 03/15/2024    AST 31 03/15/2024     03/15/2024    K 4.4 03/15/2024     03/15/2024    CREATININE 0.76 03/15/2024    BUN 10 03/15/2024    CO2 28 03/15/2024    TSH 1.29 08/04/2021    PSA 0.37 03/15/2024    INR 0.9 07/20/2023    HGBA1C 5.9 (H) 03/15/2024     Scribed for Dr. Jesus Carter by Emil Beckman.  I, Dr. Jesus Carter have personally reviewed and agreed with the information entered by the Virtual Scribe. 08/30/24.

## 2024-08-30 ENCOUNTER — APPOINTMENT (OUTPATIENT)
Dept: UROLOGY | Facility: CLINIC | Age: 49
End: 2024-08-30
Payer: COMMERCIAL

## 2024-08-30 VITALS — TEMPERATURE: 95 F | BODY MASS INDEX: 47.74 KG/M2 | WEIGHT: 315 LBS | HEIGHT: 68 IN

## 2024-08-30 DIAGNOSIS — N35.919 STRICTURE OF MALE URETHRA, UNSPECIFIED STRICTURE TYPE: Primary | ICD-10-CM

## 2024-08-30 PROCEDURE — 1036F TOBACCO NON-USER: CPT | Performed by: STUDENT IN AN ORGANIZED HEALTH CARE EDUCATION/TRAINING PROGRAM

## 2024-08-30 PROCEDURE — 3008F BODY MASS INDEX DOCD: CPT | Performed by: STUDENT IN AN ORGANIZED HEALTH CARE EDUCATION/TRAINING PROGRAM

## 2024-08-30 PROCEDURE — 51736 URINE FLOW MEASUREMENT: CPT | Performed by: STUDENT IN AN ORGANIZED HEALTH CARE EDUCATION/TRAINING PROGRAM

## 2024-08-30 PROCEDURE — 99214 OFFICE O/P EST MOD 30 MIN: CPT | Performed by: STUDENT IN AN ORGANIZED HEALTH CARE EDUCATION/TRAINING PROGRAM

## 2024-08-30 PROCEDURE — 51798 US URINE CAPACITY MEASURE: CPT | Performed by: STUDENT IN AN ORGANIZED HEALTH CARE EDUCATION/TRAINING PROGRAM

## 2024-08-30 ASSESSMENT — PAIN SCALES - GENERAL: PAINLEVEL: 0-NO PAIN

## 2024-09-13 PROCEDURE — RXMED WILLOW AMBULATORY MEDICATION CHARGE

## 2024-09-16 ENCOUNTER — PHARMACY VISIT (OUTPATIENT)
Dept: PHARMACY | Facility: CLINIC | Age: 49
End: 2024-09-16
Payer: COMMERCIAL

## 2024-10-02 PROCEDURE — RXMED WILLOW AMBULATORY MEDICATION CHARGE

## 2024-10-08 ENCOUNTER — PHARMACY VISIT (OUTPATIENT)
Dept: PHARMACY | Facility: CLINIC | Age: 49
End: 2024-10-08
Payer: COMMERCIAL

## 2024-10-08 PROCEDURE — RXMED WILLOW AMBULATORY MEDICATION CHARGE

## 2024-10-11 PROCEDURE — RXMED WILLOW AMBULATORY MEDICATION CHARGE

## 2024-10-14 ENCOUNTER — PHARMACY VISIT (OUTPATIENT)
Dept: PHARMACY | Facility: CLINIC | Age: 49
End: 2024-10-14
Payer: COMMERCIAL

## 2024-11-10 PROCEDURE — RXMED WILLOW AMBULATORY MEDICATION CHARGE

## 2024-11-14 ENCOUNTER — PHARMACY VISIT (OUTPATIENT)
Dept: PHARMACY | Facility: CLINIC | Age: 49
End: 2024-11-14
Payer: COMMERCIAL

## 2024-11-15 ENCOUNTER — PHARMACY VISIT (OUTPATIENT)
Dept: PHARMACY | Facility: CLINIC | Age: 49
End: 2024-11-15
Payer: COMMERCIAL

## 2024-11-15 PROCEDURE — RXMED WILLOW AMBULATORY MEDICATION CHARGE

## 2024-11-21 NOTE — PROGRESS NOTES
HISTORY OF PRESENT ILLNESS  This is a pleasant 48 y.o. year old male  who presents on 03/18/2024 at the request of Srinath Petty MD for evaluation of  left ankle  pain that has been present over/since  January 2024 .    How the condition occurred or started: insidious, had a week off and then returned to [employer] and tried to use stairs exclusively which may have irritated, he also showed his son how to do jumping jacks which also irritated foot and ankle  Location of pain (patient points to): medial ankle and foot  Quality of pain: Mild to moderate  Modifying Factors: worse with walking and standing  Associated Signs and symptoms: soreness, no N/T  Previous Treatment: rest    PHYSICAL EXAMINATION  Constitutional Exam: patient's height and weight reviewed, well-kempt  Psychiatric Exam: alert and oriented x 3, appropriate mood and behavior  Eye Exam: ARTURO, EOMI  Pulmonary Exam: breathing non-labored, no apparent distress  Lymphatic exam: no appreciable lymphadenopathy in the lower extremities  Cardiovascular exam: DP pulses 2+ bilaterally, PT 2+ bilaterally, toes are pink with good capillary refill, no pitting edema  Skin exam: no open lesions, rashes, abrasions or ulcerations  Neurological exam: sensation to light touch intact in both lower extremities in peripheral and dermatomal distributions (except for any abnormalities noted in musculoskeletal exam)    Musculoskeletal exam: left foot and ankle: minimally sore along posterior tibial tendon but patient notes that is where pain is when occurs, able to invert past midline, stable endpoint on anterior drawer and talar tilt test, no pain over bony structures of foot and ankle, no sinus tarsi pain, tight achilles, AAFD 2B with hyperpronation    DATA/RESULTS REVIEW: I personally reviewed the patient's x-ray images and reports of the the left ankle and foot and show maintained alignment of ankle joint with no arthrosis .  Midfoot sag due to AAFD    ASSESSMENT: left AAFD  "2B due to posterior tibial tendon dysfunction, achilles tightness  PLAN: I discussed with the patient the differential diagnosis, complex degenerative and overuse and elevated BMI related nature of the condition(s) and available treatment option(s).  Recommend stretching program for achilles and instructed patient on how to do daily.  Discussed use of orthotics to correct foot position.  The patient was given a prescription for custom-made orthotics, which are medically necessary due to the patient's medical condition and symptomatic posterior tibial tendon dysfunction and required for medial-lateral stability.  The patient is ambulatory.  Duration will be greater than 6 months.  PT orders placed and wrote out protocol for therapist.  FUV in 6 months or so.  Discussed proper shoewear.  The patient's questions were answered in detail.      Note dictated with [employer] software, completed without full type editing to avoid delay.      Dear Referring Physician,  It was my pleasure to see your patient, in the office today.  Please refer to the detailed notes above for my findings and recommendations.  Thank you once again for your consultation request.  If you have any further questions or concerns, please feel free to contact me via email or at the phone number and address below.  Sincerely,    Kimberley Rasmussen MD   of Orthopedic Surgery, [employer]  Dual Fellowship-trained in Orthopedic Sports Medicine (Knee and Shoulder), and Foot and Ankle Surgery  The  [facility] [place]   [employer] Subspecialty Certificate in Orthopedic Sports Medicine  Head Team Physician Case \"[place]\" and [facility] \"[facility]orm\"  Team [facility] Physician [id number] Paralympic Games  Team [place] Figure Skating Medical Practitioner, including International Event Coverage  Director, Foot and Ankle Division, Department of Orthopedics    University " [address], [place] [id number]  [email or web address]  Office [phone]     DISCHARGE

## 2024-12-02 ENCOUNTER — PHARMACY VISIT (OUTPATIENT)
Dept: PHARMACY | Facility: CLINIC | Age: 49
End: 2024-12-02
Payer: COMMERCIAL

## 2024-12-02 PROCEDURE — RXMED WILLOW AMBULATORY MEDICATION CHARGE

## 2024-12-11 ENCOUNTER — HOSPITAL ENCOUNTER (OUTPATIENT)
Dept: RADIOLOGY | Facility: CLINIC | Age: 49
Discharge: HOME | End: 2024-12-11
Payer: COMMERCIAL

## 2024-12-11 ENCOUNTER — OFFICE VISIT (OUTPATIENT)
Dept: URGENT CARE | Age: 49
End: 2024-12-11
Payer: COMMERCIAL

## 2024-12-11 ENCOUNTER — PHARMACY VISIT (OUTPATIENT)
Dept: PHARMACY | Facility: CLINIC | Age: 49
End: 2024-12-11
Payer: COMMERCIAL

## 2024-12-11 VITALS
WEIGHT: 315 LBS | DIASTOLIC BLOOD PRESSURE: 79 MMHG | HEART RATE: 83 BPM | OXYGEN SATURATION: 93 % | TEMPERATURE: 98 F | BODY MASS INDEX: 49.42 KG/M2 | SYSTOLIC BLOOD PRESSURE: 149 MMHG | RESPIRATION RATE: 18 BRPM

## 2024-12-11 DIAGNOSIS — H57.89 EYE REDNESS: ICD-10-CM

## 2024-12-11 DIAGNOSIS — J34.89 SINUS PRESSURE: ICD-10-CM

## 2024-12-11 DIAGNOSIS — H92.02 OTALGIA, LEFT: ICD-10-CM

## 2024-12-11 DIAGNOSIS — R05.1 ACUTE COUGH: Primary | ICD-10-CM

## 2024-12-11 DIAGNOSIS — T14.8XXA MUSCLE STRAIN: ICD-10-CM

## 2024-12-11 DIAGNOSIS — R05.1 ACUTE COUGH: ICD-10-CM

## 2024-12-11 PROCEDURE — 71046 X-RAY EXAM CHEST 2 VIEWS: CPT | Performed by: RADIOLOGY

## 2024-12-11 PROCEDURE — 99214 OFFICE O/P EST MOD 30 MIN: CPT | Performed by: FAMILY MEDICINE

## 2024-12-11 PROCEDURE — 3077F SYST BP >= 140 MM HG: CPT | Performed by: FAMILY MEDICINE

## 2024-12-11 PROCEDURE — RXMED WILLOW AMBULATORY MEDICATION CHARGE

## 2024-12-11 PROCEDURE — 71046 X-RAY EXAM CHEST 2 VIEWS: CPT

## 2024-12-11 PROCEDURE — 3078F DIAST BP <80 MM HG: CPT | Performed by: FAMILY MEDICINE

## 2024-12-11 PROCEDURE — 1036F TOBACCO NON-USER: CPT | Performed by: FAMILY MEDICINE

## 2024-12-11 RX ORDER — DOXYCYCLINE 100 MG/1
100 CAPSULE ORAL 2 TIMES DAILY
Qty: 20 CAPSULE | Refills: 0 | Status: SHIPPED | OUTPATIENT
Start: 2024-12-11 | End: 2024-12-21

## 2024-12-11 RX ORDER — BENZONATATE 200 MG/1
200 CAPSULE ORAL 3 TIMES DAILY PRN
Qty: 30 CAPSULE | Refills: 0 | Status: SHIPPED | OUTPATIENT
Start: 2024-12-11 | End: 2024-12-21

## 2024-12-11 ASSESSMENT — PATIENT HEALTH QUESTIONNAIRE - PHQ9
2. FEELING DOWN, DEPRESSED OR HOPELESS: NOT AT ALL
2. FEELING DOWN, DEPRESSED OR HOPELESS: NOT AT ALL
1. LITTLE INTEREST OR PLEASURE IN DOING THINGS: NOT AT ALL
SUM OF ALL RESPONSES TO PHQ9 QUESTIONS 1 AND 2: 0
1. LITTLE INTEREST OR PLEASURE IN DOING THINGS: SEVERAL DAYS
10. IF YOU CHECKED OFF ANY PROBLEMS, HOW DIFFICULT HAVE THESE PROBLEMS MADE IT FOR YOU TO DO YOUR WORK, TAKE CARE OF THINGS AT HOME, OR GET ALONG WITH OTHER PEOPLE: NOT DIFFICULT AT ALL
SUM OF ALL RESPONSES TO PHQ9 QUESTIONS 1 AND 2: 1

## 2024-12-11 ASSESSMENT — PAIN SCALES - GENERAL: PAINLEVEL_OUTOF10: 9

## 2024-12-11 NOTE — PROGRESS NOTES
HPI:  Patient states that he had a cold for the past 3 weeks that has been getting better, but for the past week his congestion increased and he has pressure in his face.  Pt noticed pain in his left ear yesterday and eye redness.  No blurry vision.  No discharge from the eyes.  Pt also states that from the cough he pulled his muscles on the left side of chest wall.  No cp or sob.  No n/v/diarrhea.  Home Covid was -.         ROS:  No fever  +cough  No cp   No sob  Left ear pain    PE:    A&O x3  NCAT  PERRLA, EOMI, bl conjunctival erythema w/o exudates  TM clear bl, +erythema in left ear canal  +tndop over maxillary sinuses  No pharyngeal erythema, +pnd  RRR  Good air movement, no w/r/r   +tndop over left lower latera chest wall  MOEx4  No focal deficit  Judgement normal  No submandibular nodes    Results:  CXR: initial read no acute infiltrate    A/P:   Cough  Sinus pressure  Left otalgia  Muscle strain  Eye redness     We will call you with xray results if you need further treatment.  Increase fluids.  Rest.  Eat yogurt and take probiotics when on medication.  Tylenol and Motrin as needed for pain.  Cold compress to the eyes.  Nasal saline wash.  Keep a diary of symptoms.  Recheck with your doctor in a week if no improvement. Go to the ER if starts getting worse.

## 2024-12-30 ENCOUNTER — PHARMACY VISIT (OUTPATIENT)
Dept: PHARMACY | Facility: CLINIC | Age: 49
End: 2024-12-30
Payer: COMMERCIAL

## 2024-12-30 PROCEDURE — RXMED WILLOW AMBULATORY MEDICATION CHARGE

## 2024-12-31 DIAGNOSIS — Z00.00 HEALTHCARE MAINTENANCE: ICD-10-CM

## 2025-01-02 ENCOUNTER — PHARMACY VISIT (OUTPATIENT)
Dept: PHARMACY | Facility: CLINIC | Age: 50
End: 2025-01-02
Payer: COMMERCIAL

## 2025-01-02 PROCEDURE — RXMED WILLOW AMBULATORY MEDICATION CHARGE

## 2025-01-02 RX ORDER — ESCITALOPRAM OXALATE 20 MG/1
20 TABLET ORAL DAILY
Qty: 90 TABLET | Refills: 3 | Status: SHIPPED | OUTPATIENT
Start: 2025-01-02 | End: 2026-01-01

## 2025-01-03 ENCOUNTER — PHARMACY VISIT (OUTPATIENT)
Dept: PHARMACY | Facility: CLINIC | Age: 50
End: 2025-01-03
Payer: COMMERCIAL

## 2025-01-27 PROCEDURE — RXMED WILLOW AMBULATORY MEDICATION CHARGE

## 2025-01-28 ENCOUNTER — HOSPITAL ENCOUNTER (EMERGENCY)
Facility: HOSPITAL | Age: 50
Discharge: HOME | End: 2025-01-28
Payer: COMMERCIAL

## 2025-01-28 ENCOUNTER — APPOINTMENT (OUTPATIENT)
Dept: RADIOLOGY | Facility: HOSPITAL | Age: 50
End: 2025-01-28
Payer: COMMERCIAL

## 2025-01-28 ENCOUNTER — CLINICAL SUPPORT (OUTPATIENT)
Dept: EMERGENCY MEDICINE | Facility: HOSPITAL | Age: 50
End: 2025-01-28
Payer: COMMERCIAL

## 2025-01-28 ENCOUNTER — PHARMACY VISIT (OUTPATIENT)
Dept: PHARMACY | Facility: CLINIC | Age: 50
End: 2025-01-28
Payer: COMMERCIAL

## 2025-01-28 VITALS
SYSTOLIC BLOOD PRESSURE: 146 MMHG | RESPIRATION RATE: 16 BRPM | HEIGHT: 67 IN | OXYGEN SATURATION: 95 % | DIASTOLIC BLOOD PRESSURE: 87 MMHG | TEMPERATURE: 96.8 F | WEIGHT: 315 LBS | BODY MASS INDEX: 49.44 KG/M2 | HEART RATE: 88 BPM

## 2025-01-28 DIAGNOSIS — F41.0 PANIC ATTACK: Primary | ICD-10-CM

## 2025-01-28 LAB
ALBUMIN SERPL BCP-MCNC: 4.4 G/DL (ref 3.4–5)
ALP SERPL-CCNC: 88 U/L (ref 33–120)
ALT SERPL W P-5'-P-CCNC: 50 U/L (ref 10–52)
ANION GAP SERPL CALC-SCNC: 14 MMOL/L (ref 10–20)
AST SERPL W P-5'-P-CCNC: 28 U/L (ref 9–39)
BASOPHILS # BLD AUTO: 0.01 X10*3/UL (ref 0–0.1)
BASOPHILS NFR BLD AUTO: 0.1 %
BILIRUB SERPL-MCNC: 0.6 MG/DL (ref 0–1.2)
BUN SERPL-MCNC: 14 MG/DL (ref 6–23)
CALCIUM SERPL-MCNC: 9 MG/DL (ref 8.6–10.6)
CARDIAC TROPONIN I PNL SERPL HS: <3 NG/L (ref 0–53)
CHLORIDE SERPL-SCNC: 104 MMOL/L (ref 98–107)
CO2 SERPL-SCNC: 27 MMOL/L (ref 21–32)
CREAT SERPL-MCNC: 0.8 MG/DL (ref 0.5–1.3)
EGFRCR SERPLBLD CKD-EPI 2021: >90 ML/MIN/1.73M*2
EOSINOPHIL # BLD AUTO: 0.01 X10*3/UL (ref 0–0.7)
EOSINOPHIL NFR BLD AUTO: 0.1 %
ERYTHROCYTE [DISTWIDTH] IN BLOOD BY AUTOMATED COUNT: 13.1 % (ref 11.5–14.5)
GLUCOSE SERPL-MCNC: 151 MG/DL (ref 74–99)
HCT VFR BLD AUTO: 42.4 % (ref 41–52)
HGB BLD-MCNC: 14.5 G/DL (ref 13.5–17.5)
IMM GRANULOCYTES # BLD AUTO: 0.03 X10*3/UL (ref 0–0.7)
IMM GRANULOCYTES NFR BLD AUTO: 0.4 % (ref 0–0.9)
LYMPHOCYTES # BLD AUTO: 1.44 X10*3/UL (ref 1.2–4.8)
LYMPHOCYTES NFR BLD AUTO: 18.3 %
MAGNESIUM SERPL-MCNC: 2.24 MG/DL (ref 1.6–2.4)
MCH RBC QN AUTO: 29.6 PG (ref 26–34)
MCHC RBC AUTO-ENTMCNC: 34.2 G/DL (ref 32–36)
MCV RBC AUTO: 87 FL (ref 80–100)
MONOCYTES # BLD AUTO: 0.62 X10*3/UL (ref 0.1–1)
MONOCYTES NFR BLD AUTO: 7.9 %
NEUTROPHILS # BLD AUTO: 5.78 X10*3/UL (ref 1.2–7.7)
NEUTROPHILS NFR BLD AUTO: 73.2 %
NRBC BLD-RTO: 0 /100 WBCS (ref 0–0)
PLATELET # BLD AUTO: 221 X10*3/UL (ref 150–450)
POTASSIUM SERPL-SCNC: 3.9 MMOL/L (ref 3.5–5.3)
PROT SERPL-MCNC: 7.2 G/DL (ref 6.4–8.2)
RBC # BLD AUTO: 4.9 X10*6/UL (ref 4.5–5.9)
SODIUM SERPL-SCNC: 141 MMOL/L (ref 136–145)
WBC # BLD AUTO: 7.9 X10*3/UL (ref 4.4–11.3)

## 2025-01-28 PROCEDURE — 93005 ELECTROCARDIOGRAM TRACING: CPT

## 2025-01-28 PROCEDURE — 80053 COMPREHEN METABOLIC PANEL: CPT | Performed by: PHYSICIAN ASSISTANT

## 2025-01-28 PROCEDURE — 83735 ASSAY OF MAGNESIUM: CPT | Performed by: PHYSICIAN ASSISTANT

## 2025-01-28 PROCEDURE — 84484 ASSAY OF TROPONIN QUANT: CPT | Performed by: PHYSICIAN ASSISTANT

## 2025-01-28 PROCEDURE — 71046 X-RAY EXAM CHEST 2 VIEWS: CPT

## 2025-01-28 PROCEDURE — 2500000001 HC RX 250 WO HCPCS SELF ADMINISTERED DRUGS (ALT 637 FOR MEDICARE OP): Performed by: PHYSICIAN ASSISTANT

## 2025-01-28 PROCEDURE — 99285 EMERGENCY DEPT VISIT HI MDM: CPT | Mod: 25

## 2025-01-28 PROCEDURE — 71046 X-RAY EXAM CHEST 2 VIEWS: CPT | Performed by: RADIOLOGY

## 2025-01-28 PROCEDURE — 85025 COMPLETE CBC W/AUTO DIFF WBC: CPT | Performed by: PHYSICIAN ASSISTANT

## 2025-01-28 PROCEDURE — 36415 COLL VENOUS BLD VENIPUNCTURE: CPT | Performed by: PHYSICIAN ASSISTANT

## 2025-01-28 RX ORDER — LORAZEPAM 1 MG/1
1 TABLET ORAL ONCE
Status: COMPLETED | OUTPATIENT
Start: 2025-01-28 | End: 2025-01-28

## 2025-01-28 RX ADMIN — LORAZEPAM 1 MG: 1 TABLET ORAL at 09:33

## 2025-01-28 ASSESSMENT — PAIN SCALES - GENERAL: PAINLEVEL_OUTOF10: 1

## 2025-01-28 ASSESSMENT — COLUMBIA-SUICIDE SEVERITY RATING SCALE - C-SSRS
1. IN THE PAST MONTH, HAVE YOU WISHED YOU WERE DEAD OR WISHED YOU COULD GO TO SLEEP AND NOT WAKE UP?: NO
2. HAVE YOU ACTUALLY HAD ANY THOUGHTS OF KILLING YOURSELF?: NO
6. HAVE YOU EVER DONE ANYTHING, STARTED TO DO ANYTHING, OR PREPARED TO DO ANYTHING TO END YOUR LIFE?: NO

## 2025-01-28 ASSESSMENT — PAIN - FUNCTIONAL ASSESSMENT: PAIN_FUNCTIONAL_ASSESSMENT: 0-10

## 2025-01-28 ASSESSMENT — PAIN DESCRIPTION - LOCATION: LOCATION: CHEST

## 2025-01-28 ASSESSMENT — PAIN DESCRIPTION - DESCRIPTORS: DESCRIPTORS: TIGHTNESS

## 2025-01-28 NOTE — Clinical Note
Jose Angel Winters was seen and treated in our emergency department on 1/28/2025.  He may return to work on 01/29/2025.       If you have any questions or concerns, please don't hesitate to call.      Roseann Price PA-C

## 2025-01-28 NOTE — ED PROVIDER NOTES
Emergency Department Encounter  [facility] [place] EMERGENCY MEDICINE    Patient: Jose Angel Winters  MRN: [id number]  : 1975  Date of Evaluation: 2025  ED Provider: Roseann Price PA-C      Chief Complaint       Chief Complaint   Patient presents with    Anxiety    Chest Pain     HPI    Jose Angel Winters is a 49 y.o. male who presents to the emergency department presenting for anxiety, L CP since . Pt reports he has been having a persistent anxiety attack since . Has had these before, but does not typically get chest pain with them.  Left-sided chest pain is intermittent, spontaneously resolves without intervention.  Notes that he only has chest pain when his anxiety gets very high.  No radiation of chest pain.  No associated numbness, tingling, weakness or syncope.  Denies any shortness of breath.  Takes daily Lexapro, no additional rescue meds such as benzodiazepines.  Denies any inciting events.  Notes that his chest does feel sore and the pain gets worse when he ranges his left upper extremity independently.  Has not attempted any interventions at home prior to ED arrival. Denies fever/chills, HA/dizziness, changes in hearing/vision, abdominal pain, n/v/d, focal neck/back pain, rash, cough and any other symptoms.    ROS:     Review of Systems  14 systems reviewed and otherwise acutely negative except as in the HPI.    Past History     Past Medical History:   Diagnosis Date    Allergic     Anxiety     Bilateral wrist pain 2023    Cubital tunnel syndrome, right 2023    Depression, unspecified 2013    Depression    Hypertension     Metabolic syndrome 2013    Dysmetabolic syndrome X    Obesity, unspecified 2013    Obesity    Other conditions influencing health status 2013    Spastic Flat Foot    Pain in unspecified foot 2014    Foot pain    Primary open angle glaucoma (POAG) of both eyes, mild stage 2023    Urethral meatal stenosis  08/07/2023     Past Surgical History:   Procedure Laterality Date    OTHER SURGICAL HISTORY  06/11/2018    Oral Surgery Tooth Extraction [place] Tooth    OTHER SURGICAL HISTORY  06/11/2018    Inguinal Hernia Repair For Child 6 Mo. To 5 Years Old    WISDOM TOOTH EXTRACTION       Social History     Socioeconomic History    Marital status:    Tobacco Use    Smoking status: Never    Smokeless tobacco: Never   Vaping Use    Vaping status: Never Used   Substance and Sexual Activity    Alcohol use: Yes     Alcohol/week: 2.0 standard drinks of alcohol     Types: 2 Standard drinks or equivalent per week    Drug use: Never    Sexual activity: Not Currently     Partners: Female     Birth control/protection: OCP       Medications/Allergies     Discharge Medication List as of 1/28/2025 10:51 AM        CONTINUE these medications which have NOT CHANGED    Details   brimonidine-timoloL (Combigan) 0.2-0.5 % ophthalmic solution Administer 1 drop into both eyes in the morning and 1 drop before bedtime., Starting Mon 11/11/2024, Normal      cetirizine (All Day Allergy, cetirizine,) 10 mg tablet Take 1 tablet (10 mg) by mouth once daily., Historical Med      cholecalciferol (Vitamin D-3) 50 MCG (2000 UT) tablet Take 1 tablet (50 mcg) by mouth once daily., Historical Med      escitalopram (Lexapro) 20 mg tablet TAKE 1 TABLET BY MOUTH ONCE DAILY, Starting Thu 1/2/2025, Until Thu 1/1/2026, Normal      fluticasone (Flonase Sensimist) 27.5 mcg/actuation nasal spray Administer 2 sprays into each nostril if needed for rhinitis., Historical Med      latanoprost (Xalatan) 0.005 % ophthalmic solution Administer 1 drop into both eyes at bedtime, Starting Fri 10/11/2024, Normal      lisinopril 20 mg tablet Take 1 tablet (20 mg) by mouth once daily., Starting Mon 2/12/2024, Until Tue 2/18/2025, Normal      melatonin (Melatin) 3 mg tablet Take 1 tablet (3 mg) by mouth once daily at bedtime., Historical Med           Allergies   Allergen  Reactions    Pseudoephedrine Hcl Other        Physical Exam       ED Triage Vitals [01/28/25 0906]   Temperature Heart Rate Respirations BP   36 °C (96.8 °F) 88 16 146/87      Pulse Ox Temp src Heart Rate Source Patient Position   95 % -- -- --      BP Location FiO2 (%)     -- --         Physical Exam    Physical Exam:     VS: As documented in the triage note and EMR flowsheet from this visit were reviewed.    Appearance: Alert, oriented, cooperative, in no acute distress. Well nourished & well hydrated.    Skin: Warm, intact and dry. No lesions, rash, or petechiae.    Neck: Supple, without meningismus. No midline tenderness    Pulmonary: Clear bilaterally with good chest wall excursion. No rales, rhonchi or wheezing. No accessory muscle use or stridor. Nonlabored breathing, no supplemental oxygen.    Cardiac: Normal S1, S2 without murmur, rub, gallop or extrasystole.    Abdomen: Soft, nontender, active bowel sounds. No rebound or guarding.    Musculoskeletal: Spontaneously moving all extremities without limitation. Extremities warm and well-perfused, capillary refill less than 2 seconds. Pulses full and equal.     Neurological:  Cranial nerves II through XII are grossly intact, finger-nose touch is normal, normal sensation, no weakness, no focal findings identified. Ambulating without assistance with steady gait, non-ataxic.    Psychiatric: Appropriate mood and affect. Kempt appearance.     Diagnostics   Labs:  Labs Reviewed   COMPREHENSIVE METABOLIC PANEL - Abnormal       Result Value    Glucose 151 (*)     Sodium 141      Potassium 3.9      Chloride 104      Bicarbonate 27      Anion Gap 14      Urea Nitrogen 14      Creatinine 0.80      eGFR >90      Calcium 9.0      Albumin 4.4      Alkaline Phosphatase 88      Total Protein 7.2      AST 28      Bilirubin, Total 0.6      ALT 50     TROPONIN I, HIGH SENSITIVITY - Normal    Troponin I, High Sensitivity (CMC) <3      Narrative:     Less than 99th percentile of  normal range cutoff-  Female and children under 18 years old <35 ng/L; Male <54 ng/L: Negative  Repeat testing should be performed if clinically indicated.     Female and children under 18 years old  ng/L; Male  ng/L:  Consistent with possible cardiac damage and possible increased clinical   risk. Serial measurements may help to assess extent of myocardial damage.     >120 ng/L: Consistent with cardiac damage, increased clinical risk and  myocardial infarction. Serial measurements may help assess extent of   myocardial damage.      NOTE: Children less than 1 year old may have higher baseline troponin   levels and results should be interpreted in conjunction with the overall   clinical context.    NOTE: Troponin I testing is performed using a different   testing methodology at [facility] than at other   [facility]. Direct result comparisons should only   be made within the same method.     MAGNESIUM - Normal    Magnesium 2.24     CBC WITH AUTO DIFFERENTIAL    WBC 7.9      nRBC 0.0      RBC 4.90      Hemoglobin 14.5      Hematocrit 42.4      MCV 87      MCH 29.6      MCHC 34.2      RDW 13.1      Platelets 221      Neutrophils % 73.2      Immature Granulocytes %, Automated 0.4      Lymphocytes % 18.3      Monocytes % 7.9      Eosinophils % 0.1      Basophils % 0.1      Neutrophils Absolute 5.78      Immature Granulocytes Absolute, Automated 0.03      Lymphocytes Absolute 1.44      Monocytes Absolute 0.62      Eosinophils Absolute 0.01      Basophils Absolute 0.01       Radiographs:  XR chest 2 views   Final Result   1.  No evidence of acute cardiopulmonary process.   2. Similar right basilar subsegmental atelectasis.        I personally reviewed the images/study and I agree with the findings   as stated by Natanael Alvarez MD (PGY-2). This study was interpreted at   [facility], [place], [place].        MACRO:   None        Signed by: Cherelle Martinez  "1/28/2025 10:34 AM   Dictation workstation:   [id number]        EKG #1 01/28/25 0909:  NSR. Normal QTC interval. No STEMI.    ED Course   Visit Vitals  /87   Pulse 88   Temp 36 °C (96.8 °F)   Resp 16   Ht 1.702 m (5' 7\")   Wt 147 kg (325 lb)   SpO2 95%   BMI 50.90 kg/m²   Smoking Status Never   BSA 2.64 m²     Medications   LORazepam (Ativan) tablet 1 mg (1 mg oral Given 1/28/25 0959)       Medical Decision Making   EKG NSR, no STEMI. Pain intermittent x2 days, will get single troponin. Pt req oral medications to pre-treat prior to IV being placed d/t severe needle phobia, already feeling anxious.  Labs are benign, including troponin. Chest x-ray without acute cardiopulmonary processes.  Referred to PCP, psychology for followup of suspected panic attacks.    Final Impression      1. Panic attack          DISPOSITION  Disposition: discharge  Patient condition is: Stable    Comment: Please note this report has been produced using speech recognition software and may contain errors related to that system including errors in grammar, punctuation, and spelling, as well as words and phrases that may be inappropriate.  If there are any questions or concerns please feel free to contact the dictating provider for clarification.    CITLALI Mcclelland PA-C  01/28/25 1058    "

## 2025-01-28 NOTE — DISCHARGE INSTRUCTIONS
EKG, chest xray and labs are normal - no evidence of heart attack or strain.  Please speak with your primary care doctor and/or psychologist regarding panic attacks as you may need to be re-evaluated for long-term medication management.  Referrals have been placed for the above if you no longer follow with a care provider.

## 2025-01-29 LAB
ATRIAL RATE: 79 BPM
P AXIS: 49 DEGREES
P OFFSET: 212 MS
P ONSET: 147 MS
PR INTERVAL: 148 MS
Q ONSET: 221 MS
QRS COUNT: 13 BEATS
QRS DURATION: 94 MS
QT INTERVAL: 376 MS
QTC CALCULATION(BAZETT): 431 MS
QTC FREDERICIA: 412 MS
R AXIS: -11 DEGREES
T AXIS: 51 DEGREES
T OFFSET: 409 MS
VENTRICULAR RATE: 79 BPM

## 2025-02-03 ENCOUNTER — PHARMACY VISIT (OUTPATIENT)
Dept: PHARMACY | Facility: CLINIC | Age: 50
End: 2025-02-03
Payer: COMMERCIAL

## 2025-02-04 ENCOUNTER — APPOINTMENT (OUTPATIENT)
Dept: PRIMARY CARE | Facility: CLINIC | Age: 50
End: 2025-02-04
Payer: COMMERCIAL

## 2025-02-04 VITALS
HEART RATE: 65 BPM | OXYGEN SATURATION: 97 % | DIASTOLIC BLOOD PRESSURE: 77 MMHG | BODY MASS INDEX: 49.44 KG/M2 | HEIGHT: 67 IN | TEMPERATURE: 97.2 F | SYSTOLIC BLOOD PRESSURE: 128 MMHG | WEIGHT: 315 LBS

## 2025-02-04 DIAGNOSIS — F41.0 PANIC ATTACK: ICD-10-CM

## 2025-02-04 DIAGNOSIS — Z00.00 HEALTHCARE MAINTENANCE: Primary | ICD-10-CM

## 2025-02-04 DIAGNOSIS — E78.5 HYPERLIPIDEMIA, UNSPECIFIED HYPERLIPIDEMIA TYPE: ICD-10-CM

## 2025-02-04 DIAGNOSIS — E66.813 CLASS 3 SEVERE OBESITY DUE TO EXCESS CALORIES WITH SERIOUS COMORBIDITY AND BODY MASS INDEX (BMI) OF 50.0 TO 59.9 IN ADULT: ICD-10-CM

## 2025-02-04 DIAGNOSIS — R73.03 PREDIABETES: ICD-10-CM

## 2025-02-04 DIAGNOSIS — E66.01 CLASS 3 SEVERE OBESITY DUE TO EXCESS CALORIES WITH SERIOUS COMORBIDITY AND BODY MASS INDEX (BMI) OF 50.0 TO 59.9 IN ADULT: ICD-10-CM

## 2025-02-04 DIAGNOSIS — F41.9 ANXIETY AND DEPRESSION: ICD-10-CM

## 2025-02-04 DIAGNOSIS — I10 HYPERTENSION, UNSPECIFIED TYPE: ICD-10-CM

## 2025-02-04 DIAGNOSIS — F32.A ANXIETY AND DEPRESSION: ICD-10-CM

## 2025-02-04 DIAGNOSIS — I10 ESSENTIAL HYPERTENSION: ICD-10-CM

## 2025-02-04 PROCEDURE — 3078F DIAST BP <80 MM HG: CPT | Performed by: STUDENT IN AN ORGANIZED HEALTH CARE EDUCATION/TRAINING PROGRAM

## 2025-02-04 PROCEDURE — 3008F BODY MASS INDEX DOCD: CPT | Performed by: STUDENT IN AN ORGANIZED HEALTH CARE EDUCATION/TRAINING PROGRAM

## 2025-02-04 PROCEDURE — 1036F TOBACCO NON-USER: CPT | Performed by: STUDENT IN AN ORGANIZED HEALTH CARE EDUCATION/TRAINING PROGRAM

## 2025-02-04 PROCEDURE — 99396 PREV VISIT EST AGE 40-64: CPT | Performed by: STUDENT IN AN ORGANIZED HEALTH CARE EDUCATION/TRAINING PROGRAM

## 2025-02-04 PROCEDURE — 3074F SYST BP LT 130 MM HG: CPT | Performed by: STUDENT IN AN ORGANIZED HEALTH CARE EDUCATION/TRAINING PROGRAM

## 2025-02-04 PROCEDURE — RXMED WILLOW AMBULATORY MEDICATION CHARGE

## 2025-02-04 RX ORDER — ALPRAZOLAM 0.5 MG/1
0.5 TABLET ORAL DAILY PRN
Qty: 30 TABLET | Refills: 0 | Status: SHIPPED | OUTPATIENT
Start: 2025-02-04 | End: 2025-10-02

## 2025-02-04 RX ORDER — LISINOPRIL 20 MG/1
20 TABLET ORAL DAILY
Qty: 90 TABLET | Refills: 3 | Status: SHIPPED | OUTPATIENT
Start: 2025-02-04 | End: 2026-02-04

## 2025-02-04 ASSESSMENT — PATIENT HEALTH QUESTIONNAIRE - PHQ9
SUM OF ALL RESPONSES TO PHQ9 QUESTIONS 1 AND 2: 0
1. LITTLE INTEREST OR PLEASURE IN DOING THINGS: NOT AT ALL
2. FEELING DOWN, DEPRESSED OR HOPELESS: NOT AT ALL

## 2025-02-04 NOTE — PROGRESS NOTES
"Subjective   Patient ID: Jose Angel Winters is a 49 y.o. male who presents for No chief complaint on file..    HPI   See excellent provider note and labs/imaging from from recent emergency room visit. Briefly, evaluated for a panic attack with chest pain. Lab work was reassuring as was EKG. Requesting Xanax at low dose for when he has severe panic attacks. This is reasonable.     Re: HTN - at goal today! See APC notes.  Reports no sx high or low from HTN; denies blurry vision, HA, dizziness LoC CP SoB [place] and leg swelling      Re: anxiety - See above. Fair control on Lexapro 20mg. Occasional \"stress crises\"that have worsened. Requesting small supply of Xanax.     Re:  - Stable since surgery.      Re: obesity / PreDM - BMI > 40. A1c stable and A1c < 6 due for repeat A1c. Not interested in starting medication. Contemplative about meaningful lifestyle changes.      Re: CLIFTON - on CPAP. Stable.      Re: HM - Cologuard completed in 2024. PSA UTD. Shots UTD.      PMHx, FHx, Social Hx, Surg Hx personally reviewed at this appointment. No pertinent findings and/or changes from prior (if applicable).     ROS: Denies wt gain/loss f/c HA LoC CP SOB NVDC. See HPI above, and scanned sheet (if applicable). All other systems are reviewed and are without complaint.       Review of Systems    Objective   /77   Pulse 65   Temp 36.2 °C (97.2 °F)   Ht 1.702 m (5' 7\")   Wt 148 kg (326 lb)   SpO2 97%   BMI 51.06 kg/m²     Physical Exam  Gen: morbidly obese, NAD. AAO x3.  HEENT: NC/AT. Anicteric sclera, symmetric pupils. MMM no thrush.  Neck: Soft, supple. No LAD. No goiter.  CV: RRR nl s1s2 no m/r/g  Pulm: CTAB no w/r/r, good air exchange  GI: obese, soft NTND BS+ no hsm  Ext: WWP no edema  Neuro: II-XII grossly intact, nonfocal systemic findings  MSK: 5/5 strength b/l UE and LE  Gait: unremarkable     Lab Results   Component Value Date    WBC 7.9 01/28/2025    HGB 14.5 01/28/2025    HCT 42.4 01/28/2025     01/28/2025    " CHOL 137 03/15/2024    TRIG 122 03/15/2024    HDL 33.5 03/15/2024    ALT 50 01/28/2025    AST 28 01/28/2025     01/28/2025    K 3.9 01/28/2025     01/28/2025    CREATININE 0.80 01/28/2025    BUN 14 01/28/2025    CO2 27 01/28/2025    TSH 1.29 08/04/2021    PSA 0.37 03/15/2024    INR 0.9 07/20/2023    HGBA1C 5.9 (H) 03/15/2024     par    ECG 12 lead  Normal sinus rhythm with sinus arrhythmia  Normal ECG  No previous ECGs available    See ED provider note for full interpretation and clinical correlation  Confirmed by Kaila Norton ([id number]) on 1/29/2025 1:01:25 AM      Current Outpatient Medications   Medication Instructions    ALPRAZolam (XANAX) 0.5 mg, oral, Daily PRN    brimonidine-timoloL (Combigan) 0.2-0.5 % ophthalmic solution Administer 1 drop into both eyes in the morning and 1 drop before bedtime.    cetirizine (ALL DAY ALLERGY (CETIRIZINE)) 10 mg, oral, Daily    cholecalciferol (VITAMIN D-3) 50 mcg, oral, Daily    escitalopram (Lexapro) 20 mg tablet TAKE 1 TABLET BY MOUTH ONCE DAILY    fluticasone (Flonase Sensimist) 27.5 mcg/actuation nasal spray 2 sprays, Each Nostril, As needed    latanoprost (Xalatan) 0.005 % ophthalmic solution Administer 1 drop into both eyes at bedtime    lisinopril 20 mg, oral, Daily    melatonin (MELATIN) 3 mg, oral, Nightly        Assessment/Plan   Doing OK since ED visit. Many chronic conditions, however his weight and anxiety are the biggest concerns.     I have considered the risks abuse, dependence, addiction, and diversion and believe it is clinically appropriate for this patient to be have a small supply of  benzodiazepines for the diagnosis of chronic anxiety with panic attacks. OARRs run.     # HTN: at/near goal  - continue current regimen  - routine lab work if not recent  - continue lifestyle modifications      # Obesity  - counselled on wt loss via diet, exercise, and other lifestyle modifications     # Depression and/or Anxiety, with panic  - continue SSRI   -  trial of xanax     # CLIFTON on CPAP  - continue using CPAP      # PreDM  - A1C q6 mos  - wt loss with diet and exercise; declines GLP1/GIB  - continue ACEi     # meatal stenosis: improved s/p surgery  - follow up urology PRN     # Health Maintenance  - routine blood work  - Colon Cancer Screening: Teresa UTD (repeat 2027)  - PSA: UTD  - Immunizations: UTD  - AAA screening: Not indicated

## 2025-02-04 NOTE — PATIENT INSTRUCTIONS
Your blood work is up to date; no need for additional draw at this appointment    I have added or changed your medications today. See the medication section of this packet for full details.      I recommend that you lose weight via lifestyle modifications such as diet and exercise.     See me every 6 months.

## 2025-02-05 ENCOUNTER — PHARMACY VISIT (OUTPATIENT)
Dept: PHARMACY | Facility: CLINIC | Age: 50
End: 2025-02-05
Payer: COMMERCIAL

## 2025-03-31 PROCEDURE — RXMED WILLOW AMBULATORY MEDICATION CHARGE

## 2025-04-07 ENCOUNTER — PHARMACY VISIT (OUTPATIENT)
Dept: PHARMACY | Facility: CLINIC | Age: 50
End: 2025-04-07
Payer: COMMERCIAL

## 2025-04-14 PROCEDURE — RXMED WILLOW AMBULATORY MEDICATION CHARGE

## 2025-04-16 ENCOUNTER — PHARMACY VISIT (OUTPATIENT)
Dept: PHARMACY | Facility: CLINIC | Age: 50
End: 2025-04-16
Payer: COMMERCIAL

## 2025-05-17 PROCEDURE — RXMED WILLOW AMBULATORY MEDICATION CHARGE

## 2025-05-20 ENCOUNTER — PHARMACY VISIT (OUTPATIENT)
Dept: PHARMACY | Facility: CLINIC | Age: 50
End: 2025-05-20
Payer: COMMERCIAL

## 2025-06-29 PROCEDURE — RXMED WILLOW AMBULATORY MEDICATION CHARGE

## 2025-07-07 ENCOUNTER — PHARMACY VISIT (OUTPATIENT)
Dept: PHARMACY | Facility: CLINIC | Age: 50
End: 2025-07-07
Payer: COMMERCIAL

## 2025-07-21 ENCOUNTER — APPOINTMENT (OUTPATIENT)
Dept: PRIMARY CARE | Facility: CLINIC | Age: 50
End: 2025-07-21
Payer: COMMERCIAL

## 2025-07-21 VITALS
HEIGHT: 67 IN | WEIGHT: 315 LBS | TEMPERATURE: 97.1 F | SYSTOLIC BLOOD PRESSURE: 121 MMHG | OXYGEN SATURATION: 98 % | HEART RATE: 81 BPM | DIASTOLIC BLOOD PRESSURE: 79 MMHG | BODY MASS INDEX: 49.44 KG/M2

## 2025-07-21 DIAGNOSIS — E66.813 CLASS 3 SEVERE OBESITY DUE TO EXCESS CALORIES WITH SERIOUS COMORBIDITY AND BODY MASS INDEX (BMI) OF 50.0 TO 59.9 IN ADULT: ICD-10-CM

## 2025-07-21 DIAGNOSIS — R73.03 PREDIABETES: ICD-10-CM

## 2025-07-21 DIAGNOSIS — I10 ESSENTIAL HYPERTENSION: Primary | ICD-10-CM

## 2025-07-21 PROCEDURE — 3074F SYST BP LT 130 MM HG: CPT | Performed by: STUDENT IN AN ORGANIZED HEALTH CARE EDUCATION/TRAINING PROGRAM

## 2025-07-21 PROCEDURE — 99214 OFFICE O/P EST MOD 30 MIN: CPT | Performed by: STUDENT IN AN ORGANIZED HEALTH CARE EDUCATION/TRAINING PROGRAM

## 2025-07-21 PROCEDURE — 3078F DIAST BP <80 MM HG: CPT | Performed by: STUDENT IN AN ORGANIZED HEALTH CARE EDUCATION/TRAINING PROGRAM

## 2025-07-21 PROCEDURE — 3008F BODY MASS INDEX DOCD: CPT | Performed by: STUDENT IN AN ORGANIZED HEALTH CARE EDUCATION/TRAINING PROGRAM

## 2025-07-21 ASSESSMENT — PAIN SCALES - GENERAL: PAINLEVEL_OUTOF10: 0-NO PAIN

## 2025-07-21 NOTE — PATIENT INSTRUCTIONS
Please stop at any  lab ([address] if you choose to use my building) to complete your blood and/or urine work that I've ordered for you.    I will contact you with the results at my soonest convenience. I strongly urge you to use [facility] as this is the quickest and easiest way to access your results and receive my correspondences.       Your medications are up to date today, I will renew them when a refill is due.     Follow up with your specialists (psych and nutrition).    See me yearly for a complete physical exam, and sooner as needed for sick visits

## 2025-07-21 NOTE — PROGRESS NOTES
"Subjective   Patient ID: Jose Angel Winters is a 49 y.o. male who presents for No chief complaint on file..  History of Present Illness  Jose Angel Winters is a 49 year old male with hypertension who presents for blood pressure follow-up.    He has not checked his blood pressure at home recently but is taking lisinopril 20 mg daily. His blood pressure in the office today is 121/79 mmHg. He is also on Lexapro 20 mg daily and Xanax 0.5 mg as needed for anxiety, adhering to his medication regimen.    His weight has increased from 326 pounds in February to 333 pounds currently. He identifies as an 'emotional user' with eating habits influenced by psychological factors.    He continues to use a CPAP machine for sleep apnea, which is effective for breathing. He is receiving regular counseling and considering additional psychiatric oversight for his medications. No chest pain, shortness of breath, or breathing problems.      PMHx, FHx, Social Hx, Surg Hx personally reviewed at this appointment. No pertinent findings and/or changes from prior (if applicable).    ROS: Unless specified above, pt denies wt gain/loss f/c HA LoC CP SOB NVDC. See HPI above, and scanned sheet (if applicable). All other systems are reviewed and are without complaint.     Objective     /79   Pulse 81   Temp 36.2 °C (97.1 °F)   Ht 1.702 m (5' 7\")   Wt (!) 151 kg (333 lb)   SpO2 98%   BMI 52.16 kg/m²      Physical Exam  Gen: morbidly obese, NAD. AAO x3.  HEENT: NC/AT. Anicteric sclera, symmetric pupils. MMM no thrush.  Neck: Soft, supple. No LAD. No goiter.  CV: RRR nl s1s2 no m/r/g  Pulm: CTAB no w/r/r, good air exchange  GI: obese, soft NTND BS+ no hsm  Ext: WWP no edema  Neuro: II-XII grossly intact, nonfocal systemic findings  MSK: 5/5 strength b/l UE and LE  Gait: unremarkable       Lab Results   Component Value Date    WBC 7.9 01/28/2025    HGB 14.5 01/28/2025    HCT 42.4 01/28/2025     01/28/2025    CHOL 137 03/15/2024    TRIG " 122 03/15/2024    HDL 33.5 03/15/2024    ALT 50 01/28/2025    AST 28 01/28/2025     01/28/2025    K 3.9 01/28/2025     01/28/2025    CREATININE 0.80 01/28/2025    BUN 14 01/28/2025    CO2 27 01/28/2025    TSH 1.29 08/04/2021    PSA 0.37 03/15/2024    INR 0.9 07/20/2023    HGBA1C 5.9 (H) 03/15/2024     par     Current Outpatient Medications   Medication Instructions    ALPRAZolam (XANAX) 0.5 mg, oral, Daily PRN    brimonidine-timoloL (Combigan) 0.2-0.5 % ophthalmic solution Administer 1 drop into both eyes in the morning and 1 drop before bedtime.    cetirizine (ALL DAY ALLERGY (CETIRIZINE)) 10 mg, oral, Daily    cholecalciferol (VITAMIN D-3) 50 mcg, oral, Daily    escitalopram (Lexapro) 20 mg tablet TAKE 1 TABLET BY MOUTH ONCE DAILY    fluticasone (Flonase Sensimist) 27.5 mcg/actuation nasal spray 2 sprays, Each Nostril, As needed    latanoprost (Xalatan) 0.005 % ophthalmic solution Administer 1 drop into both eyes at bedtime    lisinopril 20 mg, oral, Daily    melatonin (MELATIN) 3 mg, oral, Nightly        ECG 12 lead  Normal sinus rhythm with sinus arrhythmia  Normal ECG  No previous ECGs available    See ED provider note for full interpretation and clinical correlation  Confirmed by Kaila Norton ([id number]) on 1/29/2025 1:01:25 AM           Assessment & Plan  Obesity  Weight increased to 333 lbs. Discussed psychological factors and interest in dietitian consultation.  - Encourage lifestyle modifications for weight loss.  - Support dietitian consultation for eating disorders and family-based nutritional counseling.    Primary Hypertension  Blood pressure controlled at 121/79 mmHg with current medication. Adherent to Lisinopril.  - Encourage regular home blood pressure monitoring.  - Continue Lisinopril 20 mg oral daily.    Prediabetes  Discussed importance of monitoring A1c levels.  - Order A1c test.    Panic attacks  On Escitalopram and Alprazolam. Considering psychiatric consultation for medication  management.  - Support psychiatric consultation for medication management.    General Health Maintenance  Routine blood work due. On medication affecting kidney function.  - Order CBC, CMP, and lipid panel.          Srinath Petty MD       This medical note was created with the assistance of artificial intelligence (AI) for documentation purposes. The content has been reviewed and confirmed by the healthcare provider for accuracy and completeness. Patient consented to the use of audio recording and use of AI during their visit.

## 2025-08-11 ENCOUNTER — APPOINTMENT (OUTPATIENT)
Dept: UROLOGY | Facility: CLINIC | Age: 50
End: 2025-08-11
Payer: COMMERCIAL

## 2025-08-14 ENCOUNTER — PHARMACY VISIT (OUTPATIENT)
Dept: PHARMACY | Facility: CLINIC | Age: 50
End: 2025-08-14
Payer: COMMERCIAL

## 2025-08-14 PROCEDURE — RXMED WILLOW AMBULATORY MEDICATION CHARGE

## 2025-08-20 ENCOUNTER — APPOINTMENT (OUTPATIENT)
Dept: SLEEP MEDICINE | Facility: CLINIC | Age: 50
End: 2025-08-20
Payer: COMMERCIAL

## 2025-08-20 VITALS
WEIGHT: 315 LBS | SYSTOLIC BLOOD PRESSURE: 110 MMHG | HEIGHT: 68 IN | HEART RATE: 71 BPM | TEMPERATURE: 98.4 F | BODY MASS INDEX: 47.74 KG/M2 | DIASTOLIC BLOOD PRESSURE: 70 MMHG

## 2025-08-20 DIAGNOSIS — I10 ESSENTIAL HYPERTENSION: Primary | ICD-10-CM

## 2025-08-20 DIAGNOSIS — G47.33 OBSTRUCTIVE SLEEP APNEA: ICD-10-CM

## 2025-08-20 DIAGNOSIS — G47.00 INSOMNIA, UNSPECIFIED TYPE: ICD-10-CM

## 2025-08-20 PROCEDURE — 3074F SYST BP LT 130 MM HG: CPT | Performed by: NURSE PRACTITIONER

## 2025-08-20 PROCEDURE — 3078F DIAST BP <80 MM HG: CPT | Performed by: NURSE PRACTITIONER

## 2025-08-20 PROCEDURE — 99215 OFFICE O/P EST HI 40 MIN: CPT | Performed by: NURSE PRACTITIONER

## 2025-08-20 PROCEDURE — 1036F TOBACCO NON-USER: CPT | Performed by: NURSE PRACTITIONER

## 2025-08-20 PROCEDURE — 3008F BODY MASS INDEX DOCD: CPT | Performed by: NURSE PRACTITIONER

## 2025-08-20 ASSESSMENT — SLEEP AND FATIGUE QUESTIONNAIRES
SLEEP_PROBLEM_INTERFERES_DAILY_ACTIVITIES: MUCH
DIFFICULTY_STAYING_ASLEEP: MILD
SLEEP_PROBLEM_NOTICEABLE_TO_OTHERS: SOMEWHAT
HOW LIKELY ARE YOU TO NOD OFF OR FALL ASLEEP WHILE SITTING AND READING: WOULD NEVER DOZE
HOW LIKELY ARE YOU TO NOD OFF OR FALL ASLEEP WHILE SITTING AND TALKING TO SOMEONE: WOULD NEVER DOZE
HOW LIKELY ARE YOU TO NOD OFF OR FALL ASLEEP WHILE SITTING QUIETLY AFTER LUNCH WITHOUT ALCOHOL: WOULD NEVER DOZE
HOW LIKELY ARE YOU TO NOD OFF OR FALL ASLEEP IN A CAR, WHILE STOPPED FOR A FEW MINUTES IN TRAFFIC: WOULD NEVER DOZE
SATISFACTION_WITH_CURRENT_SLEEP_PATTERN: DISSATISFIED
HOW LIKELY ARE YOU TO NOD OFF OR FALL ASLEEP WHILE LYING DOWN TO REST IN THE AFTERNOON WHEN CIRCUMSTANCES PERMIT: SLIGHT CHANCE OF DOZING
HOW LIKELY ARE YOU TO NOD OFF OR FALL ASLEEP WHEN YOU ARE A PASSENGER IN A CAR FOR AN HOUR WITHOUT A BREAK: WOULD NEVER DOZE
HOW LIKELY ARE YOU TO NOD OFF OR FALL ASLEEP WHILE WATCHING TV: WOULD NEVER DOZE
DIFFICULTY_FALLING_ASLEEP: SEVERE
ESS-CHAD TOTAL SCORE: 1
SITING INACTIVE IN A PUBLIC PLACE LIKE A CLASS ROOM OR A MOVIE THEATER: WOULD NEVER DOZE
WORRIED_DISTRESSED_DUE_TO_SLEEP: MUCH

## 2025-08-21 ENCOUNTER — APPOINTMENT (OUTPATIENT)
Dept: UROLOGY | Facility: CLINIC | Age: 50
End: 2025-08-21
Payer: COMMERCIAL

## 2025-08-21 VITALS — HEIGHT: 68 IN | TEMPERATURE: 97.8 F | BODY MASS INDEX: 51 KG/M2

## 2025-08-21 DIAGNOSIS — N35.919 STRICTURE OF MALE URETHRA, UNSPECIFIED STRICTURE TYPE: Primary | ICD-10-CM

## 2025-08-21 LAB
POC APPEARANCE, URINE: CLEAR
POC BILIRUBIN, URINE: NEGATIVE
POC BLOOD, URINE: NEGATIVE
POC COLOR, URINE: YELLOW
POC GLUCOSE, URINE: NEGATIVE MG/DL
POC KETONES, URINE: NEGATIVE MG/DL
POC LEUKOCYTES, URINE: NEGATIVE
POC NITRITE,URINE: NEGATIVE
POC PH, URINE: 5.5 PH
POC PROTEIN, URINE: NEGATIVE MG/DL
POC SPECIFIC GRAVITY, URINE: 1.01
POC UROBILINOGEN, URINE: 0.2 EU/DL

## 2025-08-21 PROCEDURE — 81003 URINALYSIS AUTO W/O SCOPE: CPT | Performed by: STUDENT IN AN ORGANIZED HEALTH CARE EDUCATION/TRAINING PROGRAM

## 2025-08-21 PROCEDURE — 51736 URINE FLOW MEASUREMENT: CPT | Performed by: STUDENT IN AN ORGANIZED HEALTH CARE EDUCATION/TRAINING PROGRAM

## 2025-08-21 PROCEDURE — 51798 US URINE CAPACITY MEASURE: CPT | Performed by: STUDENT IN AN ORGANIZED HEALTH CARE EDUCATION/TRAINING PROGRAM

## 2025-08-21 PROCEDURE — 99212 OFFICE O/P EST SF 10 MIN: CPT | Performed by: STUDENT IN AN ORGANIZED HEALTH CARE EDUCATION/TRAINING PROGRAM

## 2025-08-21 ASSESSMENT — PAIN SCALES - GENERAL: PAINLEVEL_OUTOF10: 0-NO PAIN

## 2025-09-01 PROCEDURE — RXMED WILLOW AMBULATORY MEDICATION CHARGE

## 2025-09-04 ENCOUNTER — PHARMACY VISIT (OUTPATIENT)
Dept: PHARMACY | Facility: CLINIC | Age: 50
End: 2025-09-04
Payer: COMMERCIAL

## 2026-01-26 ENCOUNTER — APPOINTMENT (OUTPATIENT)
Dept: PRIMARY CARE | Facility: CLINIC | Age: 51
End: 2026-01-26
Payer: COMMERCIAL